# Patient Record
Sex: MALE | Race: WHITE | NOT HISPANIC OR LATINO | Employment: FULL TIME | URBAN - METROPOLITAN AREA
[De-identification: names, ages, dates, MRNs, and addresses within clinical notes are randomized per-mention and may not be internally consistent; named-entity substitution may affect disease eponyms.]

---

## 2017-01-13 ENCOUNTER — GENERIC CONVERSION - ENCOUNTER (OUTPATIENT)
Dept: OTHER | Facility: OTHER | Age: 32
End: 2017-01-13

## 2017-11-08 ENCOUNTER — ALLSCRIPTS OFFICE VISIT (OUTPATIENT)
Dept: OTHER | Facility: OTHER | Age: 32
End: 2017-11-08

## 2017-11-08 DIAGNOSIS — M25.50 PAIN IN JOINT: ICD-10-CM

## 2017-11-08 DIAGNOSIS — M79.641 PAIN OF RIGHT HAND: ICD-10-CM

## 2017-11-08 DIAGNOSIS — R51.9 HEADACHE: ICD-10-CM

## 2017-11-08 DIAGNOSIS — M25.562 PAIN IN LEFT KNEE: ICD-10-CM

## 2017-11-08 DIAGNOSIS — M25.561 PAIN IN RIGHT KNEE: ICD-10-CM

## 2017-11-08 DIAGNOSIS — M79.642 PAIN OF LEFT HAND: ICD-10-CM

## 2017-11-10 NOTE — PROGRESS NOTES
Assessment    1  Headache (784 0) (R51)   2  Diffuse arthralgia (719 40) (M25 50)    Plan  Diffuse arthralgia    · (1) ALEXANDRA SCREEN W/REFLEX TO TITER/PATTERN; Status:Active; Requestedfor:08Nov2017;    · (1) CBC/PLT/DIFF; Status:Active; Requested for:08Nov2017;    · (1) C-REACTIVE PROTEIN; Status:Active; Requested for:08Nov2017;    · (1) DNA (DS) ANTIBODY; Status:Active; Requested for:08Nov2017;    · (1) LYME ANTIBODY PROFILE W/REFLEX TO WESTERN BLOT; Status:Active; Requested for:08Nov2017;    · (1) SED RATE; Status:Active; Requested PLT:[id number]; Headache    · PredniSONE 10 MG Oral Tablet; TAKE 4 TABLETS DAILY FOR 3 DAYS,3 TABLETSDAILY FOR 3 DAYS, 2 TABLETS DAILY FOR 3 DAYS AND 1 TABLET DAILY FOR 3DAYS, THEN STOP    Discussion/Summary    Follow up in 3 weeks  The patient was counseled regarding diagnostic results,-- instructions for management,-- risk factor reductions,-- prognosis,-- patient and family education,-- impressions,-- risks and benefits of treatment options,-- importance of compliance with treatment  Chief Complaint  89has been getting frequent headachesall over body Joint painsbeen taking Aleve for the pain      History of Present Illness  HPI: pt complains of headaches, joint pain in knees, shoulders, elbows, wrists, ankles, the headache is frontal and on the top of his head, it all began 2 months ago, pt has tried Aneumed which does help a little      Review of Systems   Constitutional: no fever-- and-- no chills  Musculoskeletal: arthralgias, but-- no myalgias  Active Problems  1  Acute right-sided thoracic back pain (724 1) (M54 6)   2  Chronic pain of both knees (823 02,522 33) (M25 561,M25 562,G89 29)   3  Influenza vaccine needed (V04 81) (Z23)   4  Low back pain (724 2) (M54 5)    Past Medical History  1  History of asthma (V12 69) (Z87 09)    Family History  Mother    1  Family history of seizure disorder (V17 2) (Z82 0)  Father    2   Family history of Healthy male adult    Social History   · Alcohol drinker (V49 89) (Z78 9)   · SOCIAL BASIS ONLY   ·    · Never a smoker   · No drug use   · Occupation   · [employer]   · One child  The social history was reviewed and is unchanged  Surgical History  1  Denied: History Of Prior Surgery  Surgical History Reviewed: The surgical history was reviewed and updated today  Current Meds   1  No Reported Medications Recorded    The medication list was reviewed and updated today  Allergies  1  No Known Drug Allergies    Vitals   Recorded: [id number] 05:28PM   Temperature 96 5 F, Tympanic   Heart Rate 69   Systolic 609   Diastolic 90   Height 5 ft 11 25 in   Weight 349 lb    BMI Calculated 48 33   BSA Calculated 2 68   O2 Saturation 98       Physical Exam   Constitutional  General appearance: No acute distress, well appearing and well nourished  well developed,-- appears healthy,-- well nourished,-- obese-- and-- well hydrated  Ears, Nose, Mouth, and Throat  External inspection of ears and nose: Normal    Otoscopic examination: Tympanic membrance translucent with normal light reflex  Canals patent without erythema  Nasal mucosa, septum, and turbinates: Normal without edema or erythema  Oropharynx: Normal with no erythema, edema, exudate or lesions  Pulmonary  Respiratory effort: No increased work of breathing or signs of respiratory distress  Respiratory rate: normal  Assessment of respiratory effort revealed normal rhythm and effort  Auscultation of lungs: Clear to auscultation, equal breath sounds bilaterally, no wheezes, no rales, no rhonci  no rales or crackles were heard bilaterally  no rhonchi  no friction rub  no wheezing  Cardiovascular  Auscultation of heart: Normal rate and rhythm, normal S1 and S2, without murmurs  The heart rate was normal  The rhythm was regular  Heart sounds: normal S1-- and-- normal S2  no murmurs were heard          Signatures   Electronically signed by : Samantha Albarran DO; Nov 8 2017 5:51PM EST                       (Author)

## 2017-11-13 ENCOUNTER — APPOINTMENT (OUTPATIENT)
Dept: LAB | Facility: CLINIC | Age: 32
End: 2017-11-13
Payer: COMMERCIAL

## 2017-11-13 ENCOUNTER — TRANSCRIBE ORDERS (OUTPATIENT)
Dept: LAB | Facility: CLINIC | Age: 32
End: 2017-11-13

## 2017-11-13 DIAGNOSIS — M25.561 PAIN IN RIGHT KNEE: ICD-10-CM

## 2017-11-13 DIAGNOSIS — M25.562 PAIN IN LEFT KNEE: ICD-10-CM

## 2017-11-13 DIAGNOSIS — M25.50 PAIN IN JOINT: ICD-10-CM

## 2017-11-13 DIAGNOSIS — R51.9 HEADACHE: ICD-10-CM

## 2017-11-13 LAB
BASOPHILS # BLD AUTO: 0.03 THOUSANDS/ΜL (ref 0–0.1)
BASOPHILS NFR BLD AUTO: 1 % (ref 0–1)
CRP SERPL QL: 5.6 MG/L
EOSINOPHIL # BLD AUTO: 0.18 THOUSAND/ΜL (ref 0–0.61)
EOSINOPHIL NFR BLD AUTO: 3 % (ref 0–6)
ERYTHROCYTE [DISTWIDTH] IN BLOOD BY AUTOMATED COUNT: 12.8 % (ref 11.6–15.1)
ERYTHROCYTE [SEDIMENTATION RATE] IN BLOOD: 11 MM/HOUR (ref 0–10)
HCT VFR BLD AUTO: 41.1 % (ref 36.5–49.3)
HGB BLD-MCNC: 14.2 G/DL (ref 12–17)
LYMPHOCYTES # BLD AUTO: 1.79 THOUSANDS/ΜL (ref 0.6–4.47)
LYMPHOCYTES NFR BLD AUTO: 30 % (ref 14–44)
MCH RBC QN AUTO: 29 PG (ref 26.8–34.3)
MCHC RBC AUTO-ENTMCNC: 34.5 G/DL (ref 31.4–37.4)
MCV RBC AUTO: 84 FL (ref 82–98)
MONOCYTES # BLD AUTO: 0.45 THOUSAND/ΜL (ref 0.17–1.22)
MONOCYTES NFR BLD AUTO: 8 % (ref 4–12)
NEUTROPHILS # BLD AUTO: 3.5 THOUSANDS/ΜL (ref 1.85–7.62)
NEUTS SEG NFR BLD AUTO: 58 % (ref 43–75)
NRBC BLD AUTO-RTO: 0 /100 WBCS
PLATELET # BLD AUTO: 196 THOUSANDS/UL (ref 149–390)
PMV BLD AUTO: 10.7 FL (ref 8.9–12.7)
RBC # BLD AUTO: 4.89 MILLION/UL (ref 3.88–5.62)
WBC # BLD AUTO: 5.96 THOUSAND/UL (ref 4.31–10.16)

## 2017-11-13 PROCEDURE — 36415 COLL VENOUS BLD VENIPUNCTURE: CPT

## 2017-11-13 PROCEDURE — 86140 C-REACTIVE PROTEIN: CPT

## 2017-11-13 PROCEDURE — 86430 RHEUMATOID FACTOR TEST QUAL: CPT

## 2017-11-13 PROCEDURE — 86225 DNA ANTIBODY NATIVE: CPT

## 2017-11-13 PROCEDURE — 85025 COMPLETE CBC W/AUTO DIFF WBC: CPT

## 2017-11-13 PROCEDURE — 86618 LYME DISEASE ANTIBODY: CPT

## 2017-11-13 PROCEDURE — 85652 RBC SED RATE AUTOMATED: CPT

## 2017-11-13 PROCEDURE — 86038 ANTINUCLEAR ANTIBODIES: CPT

## 2017-11-14 LAB
B BURGDOR IGG SER IA-ACNC: 0.13
B BURGDOR IGM SER IA-ACNC: 0.19
DSDNA AB SER-ACNC: <1 IU/ML (ref 0–9)
RHEUMATOID FACT SER QL LA: NEGATIVE

## 2017-11-15 LAB — RYE IGE QN: NEGATIVE

## 2017-12-06 ENCOUNTER — GENERIC CONVERSION - ENCOUNTER (OUTPATIENT)
Dept: OTHER | Facility: OTHER | Age: 32
End: 2017-12-06

## 2018-01-12 VITALS
HEIGHT: 71 IN | HEART RATE: 69 BPM | BODY MASS INDEX: 44.1 KG/M2 | SYSTOLIC BLOOD PRESSURE: 150 MMHG | DIASTOLIC BLOOD PRESSURE: 90 MMHG | OXYGEN SATURATION: 98 % | TEMPERATURE: 96.5 F | WEIGHT: 315 LBS

## 2018-01-24 VITALS
WEIGHT: 315 LBS | HEART RATE: 63 BPM | SYSTOLIC BLOOD PRESSURE: 150 MMHG | DIASTOLIC BLOOD PRESSURE: 88 MMHG | TEMPERATURE: 96.6 F | BODY MASS INDEX: 44.1 KG/M2 | OXYGEN SATURATION: 98 % | HEIGHT: 71 IN

## 2018-04-02 LAB
ANION GAP SERPL CALCULATED.3IONS-SCNC: 12.6 MM/L
APTT PPP: 31.3 SEC (ref 24.4–37.6)
BASOPHILS # BLD AUTO: 0 X3/UL (ref 0–0.3)
BASOPHILS # BLD AUTO: 0.6 % (ref 0–2)
BUN SERPL-MCNC: 14 MG/DL (ref 7–25)
CALCIUM SERPL-MCNC: 9.3 MG/DL (ref 8.6–10.5)
CHLORIDE SERPL-SCNC: 105 MM/L (ref 98–107)
CO2 SERPL-SCNC: 25 MM/L (ref 21–31)
CREAT SERPL-MCNC: 0.81 MG/DL (ref 0.7–1.3)
DEPRECATED RDW RBC AUTO: 14.1 %
EGFR (HISTORICAL): > 60 GFR
EGFR AFRICAN AMERICAN (HISTORICAL): > 60 GFR
EOSINOPHIL # BLD AUTO: 0.1 X3/UL (ref 0–0.5)
EOSINOPHIL NFR BLD AUTO: 2.5 % (ref 0–5)
GLUCOSE (HISTORICAL): 102 MG/DL (ref 65–99)
HCT VFR BLD AUTO: 38.9 % (ref 42–52)
HGB BLD-MCNC: 13.5 G/DL (ref 14–18)
INR PPP: 1.02 (ref 0.9–1.5)
LDL CHOLESTEROL DIRECT (HISTORICAL): 58 MG/DL
LDL CHOLESTEROL DIRECT (HISTORICAL): 58 MG/DL
LYMPHOCYTES # BLD AUTO: 1.4 X3/UL (ref 1.2–4.2)
LYMPHOCYTES NFR BLD AUTO: 27.2 % (ref 20.5–51.1)
MCH RBC QN AUTO: 29.5 PG (ref 26–34)
MCHC RBC AUTO-ENTMCNC: 34.7 G/DL (ref 31–37)
MCV RBC AUTO: 85.1 FL (ref 81–99)
MONOCYTES # BLD AUTO: 0.4 X3/UL (ref 0–1)
MONOCYTES NFR BLD AUTO: 7.6 % (ref 1.7–12)
NEUTROPHILS # BLD AUTO: 3.1 X3/UL (ref 1.4–6.5)
NEUTS SEG NFR BLD AUTO: 62.1 % (ref 42.2–75.2)
OSMOLALITY, SERUM (HISTORICAL): 278 MOSM (ref 262–291)
PLATELET # BLD AUTO: 145 X3/UL (ref 130–400)
PMV BLD AUTO: 9.1 FL
POTASSIUM SERPL-SCNC: 3.6 MM/L (ref 3.5–5.5)
PROTHROMBIN TIME (HISTORICAL): 11.7 SEC (ref 10.1–12.9)
RBC # BLD AUTO: 4.58 X6/UL (ref 4.3–5.9)
SODIUM SERPL-SCNC: 139 MM/L (ref 134–143)
WBC # BLD AUTO: 5.1 X3/UL (ref 4.8–10.8)

## 2018-10-16 ENCOUNTER — HOSPITAL ENCOUNTER (EMERGENCY)
Facility: HOSPITAL | Age: 33
Discharge: HOME | End: 2018-10-16
Attending: EMERGENCY MEDICINE | Admitting: EMERGENCY MEDICINE
Payer: COMMERCIAL

## 2018-10-16 VITALS
DIASTOLIC BLOOD PRESSURE: 96 MMHG | BODY MASS INDEX: 41.17 KG/M2 | OXYGEN SATURATION: 100 % | SYSTOLIC BLOOD PRESSURE: 162 MMHG | WEIGHT: 304 LBS | RESPIRATION RATE: 18 BRPM | HEIGHT: 72 IN | TEMPERATURE: 99.1 F | HEART RATE: 114 BPM

## 2018-10-16 DIAGNOSIS — L02.91 ABSCESS: ICD-10-CM

## 2018-10-16 DIAGNOSIS — L03.90 CELLULITIS: Primary | ICD-10-CM

## 2018-10-16 PROCEDURE — 87186 SC STD MICRODIL/AGAR DIL: CPT | Performed by: EMERGENCY MEDICINE

## 2018-10-16 PROCEDURE — 87147 CULTURE TYPE IMMUNOLOGIC: CPT | Performed by: EMERGENCY MEDICINE

## 2018-10-16 PROCEDURE — 87070 CULTURE OTHR SPECIMN AEROBIC: CPT | Performed by: EMERGENCY MEDICINE

## 2018-10-16 PROCEDURE — 87205 SMEAR GRAM STAIN: CPT | Performed by: EMERGENCY MEDICINE

## 2018-10-16 PROCEDURE — 99283 EMERGENCY DEPT VISIT LOW MDM: CPT

## 2018-10-16 RX ORDER — SULFAMETHOXAZOLE AND TRIMETHOPRIM 800; 160 MG/1; MG/1
2 TABLET ORAL ONCE
Status: COMPLETED | OUTPATIENT
Start: 2018-10-16 | End: 2018-10-16

## 2018-10-16 RX ORDER — SULFAMETHOXAZOLE AND TRIMETHOPRIM 800; 160 MG/1; MG/1
1 TABLET ORAL 2 TIMES DAILY
Qty: 14 TABLET | Refills: 0 | Status: SHIPPED | OUTPATIENT
Start: 2018-10-16 | End: 2018-10-20 | Stop reason: HOSPADM

## 2018-10-16 RX ADMIN — SULFAMETHOXAZOLE AND TRIMETHOPRIM 2 TABLET: 800; 160 TABLET ORAL at 20:03

## 2018-10-16 NOTE — DISCHARGE INSTRUCTIONS
Abscess     I have juan m the area of redness around the wound  If you develop:        Fever        Increasing redness        Feeling ill  Then you must return to the ER  EP developed an abscess the comes to a head please return to the emergency department to have it opened      [address]:   A warm compress may help your abscess drain  Your healthcare provider may make a cut in the abscess so it can drain  You may need surgery to remove an abscess that is on your hands or buttocks  DISCHARGE INSTRUCTIONS:   Return to the emergency department if:   · The area around your abscess becomes very painful, warm, or has red streaks  · You have a fever and chills  · Your heart is beating faster than usual      · You feel faint or confused  Contact your healthcare provider if:   · Your abscess gets bigger or does not get better  · Your abscess returns  · You have questions or concerns about your condition or care  Medicines: You may  need any of the following:  · Antibiotics  help treat a bacterial infection  · Acetaminophen  decreases pain and fever  It is available without a doctor's order  Ask how much to take and how often to take it  Follow directions  Acetaminophen can cause liver damage if not taken correctly  · NSAIDs , such as ibuprofen, help decrease swelling, pain, and fever  This medicine is available with or without a doctor's order  NSAIDs can cause stomach bleeding or kidney problems in certain people  If you take blood thinner medicine, always ask your healthcare provider if NSAIDs are safe for you  Always read the medicine label and follow directions  · Take your medicine as directed  Contact your healthcare provider if you think your medicine is not helping or if you have side effects  Tell him or her if you are allergic to any medicine  Keep a list of the medicines, vitamins, and herbs you take  Include the amounts, and when and why you take them   Bring the list or the pill bottles to follow-up visits  Carry your medicine list with you in case of an emergency  Self-care:   · Apply a warm compress to your abscess  This will help it open and drain  Wet a washcloth in warm, but not hot, water  Apply the compress for 10 minutes  Repeat this 4 times each day  Do not  press on an abscess or try to open it with a needle  You may push the bacteria deeper or into your blood  · Do not share your clothes, towels, or sheets with anyone  This can spread the infection to others  · Wash your hands often  This can help prevent the spread of germs  Use soap and water or an alcohol-based hand rub  Care for your wound after it is drained:   · Care for your wound as directed  If your healthcare provider says it is okay, carefully remove the bandage and gauze packing  You may need to soak the gauze to get it out of your wound  Clean your wound and the area around it as directed  Dry the area and put on new, clean bandages  Change your bandages when they get wet or dirty  · Ask your healthcare provider how to change the gauze in your wound  Keep track of how many pieces of gauze are placed inside the wound  Do not put too much packing in the wound  Do not pack the gauze too tightly in your wound  Follow up with your healthcare provider in 1 to 3 days: You may need to have your packing removed or your bandage changed  Write down your questions so you remember to ask them during your visits  © 2017 [address] Information is for End User's use only and may not be sold, redistributed or otherwise used for commercial purposes  All illustrations and images included in CareNotes® are the copyrighted property of [employer]  or [employer]  The above information is an  only  It is not intended as medical advice for individual conditions or treatments   Talk to your doctor, nurse or pharmacist before following any medical regimen to see if it is safe and effective for you  Cellulitis, Ambulatory Care   GENERAL INFORMATION:   Cellulitis  is a skin infection caused by bacteria  Common symptoms include the following:   · Fever    · A red, warm, swollen area on your skin    · Pain when the area is touched    · Bumps or blisters (abscess) that may drain pus    · Bumpy, raised skin that feels like an orange peel  Seek immediate care for the following symptoms:   · An increase in pain, redness, warmth, and size    · Red streaks coming from the infected area    · A thin, gray-brown discharge coming from your infected skin area    · A crackling under your skin when you touch it    · Purple dots or bumps on your skin, or bleeding under your skin    · New swelling and pain in your legs    · Sudden trouble breathing or chest pain  Treatment for cellulitis  may include medicines to treat the bacterial infection or decrease pain  The infection may need to be cleaned out  Damaged, dead, or infected tissue may need to be cut away to help your wound heal   Manage your symptoms:   · Elevate your wound above the level of your heart  as often as you can  This will help decrease swelling and pain  Prop your wound on pillows or blankets to keep it elevated comfortably  · Clean your wound as directed  You may need to wash the wound with soap and water  Look for signs of infection  · Wear pressure stockings as directed  The stockings are tight and put pressure on your legs  This improves blood flow and decreases swelling  Prevent cellulitis:   · Wash your hands often  Use soap and water  Wash your hands after you use the bathroom, change a child's diapers, or sneeze  Wash your hands before you prepare or eat food  Use lotion to prevent dry, cracked skin  · Do not share personal items, such as towels, clothing, and razors  · Clean exercise equipment  with germ-killing  before and after you use it    Follow up with your healthcare provider as directed:  Write down your questions so you remember to ask them during your visits  CARE AGREEMENT:   You have the right to help plan your care  Learn about your health condition and how it may be treated  Discuss treatment options with your caregivers to decide what care you want to receive  You always have the right to refuse treatment  The above information is an  only  It is not intended as medical advice for individual conditions or treatments  Talk to your doctor, nurse or pharmacist before following any medical regimen to see if it is safe and effective for you  © 2014 [address] is for End User's use only and may not be sold, redistributed or otherwise used for commercial purposes  All illustrations and images included in CareNotes® are the copyrighted property of [employer]  or [employer]

## 2018-10-16 NOTE — ED PROVIDER NOTES
History  Chief Complaint   Patient presents with    Abscess     R proximal thigh     Patient is a 50-year-old man who presents complaining of several days of pain and swelling in his right groin  He says he believes he has an abscess  He has no fever chills  He says is painful to walk            None       Past Medical History:   Diagnosis Date    Seasonal allergies        History reviewed  No pertinent surgical history  History reviewed  No pertinent family history  I have reviewed and agree with the history as documented  Social History   Substance Use Topics    Smoking status: Never Smoker    Smokeless tobacco: Never Used    Alcohol use No        Review of Systems   Constitutional: Negative for chills, fatigue, fever and unexpected weight change  HENT: Negative for congestion and nosebleeds  Eyes: Negative for visual disturbance  Respiratory: Negative for chest tightness and shortness of breath  Cardiovascular: Negative for chest pain, palpitations and leg swelling  Gastrointestinal: Negative for abdominal pain, blood in stool, diarrhea, nausea and vomiting  Endocrine: Negative for cold intolerance and heat intolerance  Genitourinary: Negative for difficulty urinating  Musculoskeletal: Negative for arthralgias, back pain, gait problem, joint swelling and myalgias  Skin: Negative for rash  Neurological: Negative for dizziness, speech difficulty, weakness and headaches  Psychiatric/Behavioral: Negative for behavioral problems, confusion, self-injury and suicidal ideas  All other systems reviewed and are negative  Physical Exam  Physical Exam   Constitutional: He is oriented to person, place, and time  He appears well-developed and well-nourished  HENT:   Head: Normocephalic and atraumatic  Nose: Nose normal    Eyes: Pupils are equal, round, and reactive to light  EOM are normal    Neck: Normal range of motion  Neck supple     Cardiovascular: Normal rate, regular rhythm and normal heart sounds  Exam reveals no gallop and no friction rub  No murmur heard  Pulmonary/Chest: Effort normal and breath sounds normal  No respiratory distress  He has no wheezes  He has no rales  Abdominal: Soft  He exhibits no distension  There is no tenderness  There is no rebound and no guarding  Musculoskeletal: Normal range of motion  He exhibits no edema  Neurological: He is alert and oriented to person, place, and time  Skin: Skin is warm and dry  Patient has a 2 cm area of induration in his right groin  Surrounding that patient has a cm of erythema consistent with cellulitis  There is no fluctuance   Psychiatric: He has a normal mood and affect  His behavior is normal  Judgment and thought content normal    Nursing note and vitals reviewed        Vital Signs  ED Triage Vitals [10/16/18 1937]   Temperature Pulse Respirations Blood Pressure SpO2   99 1 °F (37 3 °C) (!) 114 18 162/96 100 %      Temp Source Heart Rate Source Patient Position - Orthostatic VS BP Location FiO2 (%)   Tympanic Monitor Sitting Left arm --      Pain Score       7           Vitals:    10/16/18 1937   BP: 162/96   Pulse: (!) 114   Patient Position - Orthostatic VS: Sitting       Visual Acuity      ED Medications  Medications   sulfamethoxazole-trimethoprim (BACTRIM DS) 800-160 mg per tablet 2 tablet (not administered)       Diagnostic Studies  Results Reviewed     Procedure Component Value Units Date/Time    Wound culture and Gram stain [[id number]]     Lab Status:  No result Specimen:  Wound                  No orders to display              Procedures  Incision/Drainage  Date/Time: 10/16/2018 7:43 PM  Performed by: Amador Villalobos by: MARLENE Ladd     Patient location:  ED  Consent:     Consent obtained:  Verbal    Consent given by:  Patient  Universal protocol:     Procedure explained and questions answered to patient or proxy's satisfaction: yes      Patient identity confirmed:  Verbally with patient  Location:     Type:  Abscess  Pre-procedure details:     Skin preparation:  Betadine  Anesthesia (see MAR for exact dosages): Anesthesia method:  None  Procedure details:     Complexity:  Simple    Needle aspiration: yes      Needle size:  18 G    Drainage:  Purulent    Drainage amount:  Scant  Comments:      Patient had an area of induration  With needle aspiration was able to get less than 1 cc out  This was sent for culture  Phone Contacts  ED Phone Contact    ED Course  ED Course as of Oct 16 1959   Tue Oct 16, 2018   1953 I have circumscribed area of cellulitis  After needle aspiration I will begin patient on p o  Antibiotics  As cellulitis resolves and the small abscess comes to a head I patient has been instructed to return for surgical I and D  MDM  CritCare Time    Disposition  Final diagnoses:   Cellulitis   Abscess     Time reflects when diagnosis was documented in both MDM as applicable and the Disposition within this note     Time User Action Codes Description Comment    10/16/2018  7:54 PM Mallissa Aranda Add [L03 90] Cellulitis     10/16/2018  7:55 PM Mallissa Aranda Add [L02 91] Abscess       ED Disposition     ED Disposition Condition Comment    Discharge  Isis Coffey discharge to home/self care      Condition at discharge: Good        Follow-up Information     Follow up With Specialties Details Why Contact Info    Teresa Bella DO Family Medicine   [facility] [address] [facility]  [phone]      Claudette Brock, MD General Surgery, Radiology Call  [address]  [address] [facility]  [phone]            Patient's Medications   Discharge Prescriptions    SULFAMETHOXAZOLE-TRIMETHOPRIM (BACTRIM DS) 800-160 MG PER TABLET    Take 1 tablet by mouth 2 (two) times a day for 7 days smx-tmp DS (BACTRIM) 800-160 mg tabs (1tab q12 D10)       Start Date: 10/16/2018End Date: 10/23/2018       Order Dose: 1 tablet Quantity: 14 tablet    Refills: 0     No discharge procedures on file      ED Provider  Electronically Signed by           Ziyad Dailey MD  10/16/18 9737       Ziyad Dailey MD  10/16/18 [id number]       Ziyad Dailey MD  10/16/18 2003

## 2018-10-17 ENCOUNTER — OFFICE VISIT (OUTPATIENT)
Dept: FAMILY MEDICINE CLINIC | Facility: CLINIC | Age: 33
End: 2018-10-17
Payer: COMMERCIAL

## 2018-10-17 ENCOUNTER — HOSPITAL ENCOUNTER (INPATIENT)
Facility: HOSPITAL | Age: 33
LOS: 2 days | Discharge: HOME | DRG: 603 | End: 2018-10-20
Attending: EMERGENCY MEDICINE | Admitting: INTERNAL MEDICINE
Payer: COMMERCIAL

## 2018-10-17 VITALS
TEMPERATURE: 102 F | HEIGHT: 72 IN | DIASTOLIC BLOOD PRESSURE: 90 MMHG | BODY MASS INDEX: 41.99 KG/M2 | SYSTOLIC BLOOD PRESSURE: 162 MMHG | WEIGHT: 310 LBS

## 2018-10-17 DIAGNOSIS — L02.234 CARBUNCLE OF GROIN: Primary | ICD-10-CM

## 2018-10-17 DIAGNOSIS — R50.9 FEVER: ICD-10-CM

## 2018-10-17 DIAGNOSIS — L02.214 ABSCESS OF RIGHT GROIN: Primary | ICD-10-CM

## 2018-10-17 DIAGNOSIS — E66.01 CLASS 3 SEVERE OBESITY DUE TO EXCESS CALORIES WITHOUT SERIOUS COMORBIDITY WITH BODY MASS INDEX (BMI) OF 40.0 TO 44.9 IN ADULT (HCC): ICD-10-CM

## 2018-10-17 DIAGNOSIS — L03.115 CELLULITIS OF RIGHT THIGH: ICD-10-CM

## 2018-10-17 DIAGNOSIS — L03.818 CELLULITIS OF OTHER SPECIFIED SITE: ICD-10-CM

## 2018-10-17 PROBLEM — B99.9 FEVER DUE TO INFECTION: Status: ACTIVE | Noted: 2018-10-17

## 2018-10-17 LAB
ANION GAP SERPL CALCULATED.3IONS-SCNC: 7 MMOL/L (ref 4–13)
BASOPHILS # BLD AUTO: 0.03 THOUSANDS/ΜL (ref 0–0.1)
BASOPHILS NFR BLD AUTO: 0 % (ref 0–1)
BILIRUB UR QL STRIP: NEGATIVE
BUN SERPL-MCNC: 13 MG/DL (ref 5–25)
CALCIUM SERPL-MCNC: 9.2 MG/DL (ref 8.3–10.1)
CHLORIDE SERPL-SCNC: 98 MMOL/L (ref 100–108)
CLARITY UR: CLEAR
CO2 SERPL-SCNC: 31 MMOL/L (ref 21–32)
COLOR UR: YELLOW
CREAT SERPL-MCNC: 0.99 MG/DL (ref 0.6–1.3)
EOSINOPHIL # BLD AUTO: 0.05 THOUSAND/ΜL (ref 0–0.61)
EOSINOPHIL NFR BLD AUTO: 1 % (ref 0–6)
ERYTHROCYTE [DISTWIDTH] IN BLOOD BY AUTOMATED COUNT: 12.3 % (ref 11.6–15.1)
GFR SERPL CREATININE-BSD FRML MDRD: 99 ML/MIN/1.73SQ M
GLUCOSE SERPL-MCNC: 96 MG/DL (ref 65–140)
GLUCOSE UR STRIP-MCNC: NEGATIVE MG/DL
HCT VFR BLD AUTO: 40.1 % (ref 36.5–49.3)
HGB BLD-MCNC: 13.5 G/DL (ref 12–17)
HGB UR QL STRIP.AUTO: NEGATIVE
IMM GRANULOCYTES # BLD AUTO: 0.06 THOUSAND/UL (ref 0–0.2)
IMM GRANULOCYTES NFR BLD AUTO: 1 % (ref 0–2)
KETONES UR STRIP-MCNC: NEGATIVE MG/DL
LACTATE SERPL-SCNC: 1.2 MMOL/L (ref 0.5–2)
LEUKOCYTE ESTERASE UR QL STRIP: NEGATIVE
LYMPHOCYTES # BLD AUTO: 1.62 THOUSANDS/ΜL (ref 0.6–4.47)
LYMPHOCYTES NFR BLD AUTO: 15 % (ref 14–44)
MCH RBC QN AUTO: 29 PG (ref 26.8–34.3)
MCHC RBC AUTO-ENTMCNC: 33.7 G/DL (ref 31.4–37.4)
MCV RBC AUTO: 86 FL (ref 82–98)
MONOCYTES # BLD AUTO: 1.13 THOUSAND/ΜL (ref 0.17–1.22)
MONOCYTES NFR BLD AUTO: 11 % (ref 4–12)
NEUTROPHILS # BLD AUTO: 7.74 THOUSANDS/ΜL (ref 1.85–7.62)
NEUTS SEG NFR BLD AUTO: 72 % (ref 43–75)
NITRITE UR QL STRIP: NEGATIVE
NRBC BLD AUTO-RTO: 0 /100 WBCS
PH UR STRIP.AUTO: 6 [PH] (ref 4.5–8)
PLATELET # BLD AUTO: 166 THOUSANDS/UL (ref 149–390)
PMV BLD AUTO: 9.5 FL (ref 8.9–12.7)
POTASSIUM SERPL-SCNC: 4.8 MMOL/L (ref 3.5–5.3)
PROT UR STRIP-MCNC: NEGATIVE MG/DL
RBC # BLD AUTO: 4.66 MILLION/UL (ref 3.88–5.62)
SODIUM SERPL-SCNC: 136 MMOL/L (ref 136–145)
SP GR UR STRIP.AUTO: 1.01 (ref 1–1.03)
UROBILINOGEN UR QL STRIP.AUTO: 0.2 E.U./DL
WBC # BLD AUTO: 10.63 THOUSAND/UL (ref 4.31–10.16)

## 2018-10-17 PROCEDURE — 1111F DSCHRG MED/CURRENT MED MERGE: CPT | Performed by: FAMILY MEDICINE

## 2018-10-17 PROCEDURE — 83605 ASSAY OF LACTIC ACID: CPT | Performed by: PHYSICIAN ASSISTANT

## 2018-10-17 PROCEDURE — 99214 OFFICE O/P EST MOD 30 MIN: CPT | Performed by: FAMILY MEDICINE

## 2018-10-17 PROCEDURE — 81003 URINALYSIS AUTO W/O SCOPE: CPT | Performed by: PHYSICIAN ASSISTANT

## 2018-10-17 PROCEDURE — 96375 TX/PRO/DX INJ NEW DRUG ADDON: CPT

## 2018-10-17 PROCEDURE — 36415 COLL VENOUS BLD VENIPUNCTURE: CPT | Performed by: PHYSICIAN ASSISTANT

## 2018-10-17 PROCEDURE — 0H9AXZZ DRAINAGE OF INGUINAL SKIN, EXTERNAL APPROACH: ICD-10-PCS | Performed by: PHYSICIAN ASSISTANT

## 2018-10-17 PROCEDURE — 99218 PR INITIAL OBSERVATION CARE/DAY 30 MINUTES: CPT | Performed by: PHYSICIAN ASSISTANT

## 2018-10-17 PROCEDURE — 87070 CULTURE OTHR SPECIMN AEROBIC: CPT | Performed by: PHYSICIAN ASSISTANT

## 2018-10-17 PROCEDURE — 85025 COMPLETE CBC W/AUTO DIFF WBC: CPT | Performed by: PHYSICIAN ASSISTANT

## 2018-10-17 PROCEDURE — 87147 CULTURE TYPE IMMUNOLOGIC: CPT | Performed by: PHYSICIAN ASSISTANT

## 2018-10-17 PROCEDURE — 87205 SMEAR GRAM STAIN: CPT | Performed by: PHYSICIAN ASSISTANT

## 2018-10-17 PROCEDURE — 96365 THER/PROPH/DIAG IV INF INIT: CPT

## 2018-10-17 PROCEDURE — 87186 SC STD MICRODIL/AGAR DIL: CPT | Performed by: PHYSICIAN ASSISTANT

## 2018-10-17 PROCEDURE — 87040 BLOOD CULTURE FOR BACTERIA: CPT | Performed by: PHYSICIAN ASSISTANT

## 2018-10-17 PROCEDURE — 80048 BASIC METABOLIC PNL TOTAL CA: CPT | Performed by: PHYSICIAN ASSISTANT

## 2018-10-17 PROCEDURE — 99285 EMERGENCY DEPT VISIT HI MDM: CPT

## 2018-10-17 RX ORDER — ACETAMINOPHEN 325 MG/1
650 TABLET ORAL EVERY 6 HOURS PRN
Status: DISCONTINUED | OUTPATIENT
Start: 2018-10-17 | End: 2018-10-20 | Stop reason: HOSPADM

## 2018-10-17 RX ORDER — LIDOCAINE HYDROCHLORIDE AND EPINEPHRINE 10; 10 MG/ML; UG/ML
10 INJECTION, SOLUTION INFILTRATION; PERINEURAL ONCE
Status: COMPLETED | OUTPATIENT
Start: 2018-10-17 | End: 2018-10-17

## 2018-10-17 RX ORDER — OXYCODONE HYDROCHLORIDE AND ACETAMINOPHEN 5; 325 MG/1; MG/1
1 TABLET ORAL EVERY 4 HOURS PRN
Status: DISCONTINUED | OUTPATIENT
Start: 2018-10-17 | End: 2018-10-20 | Stop reason: HOSPADM

## 2018-10-17 RX ORDER — HEPARIN SODIUM 5000 [USP'U]/ML
7500 INJECTION, SOLUTION INTRAVENOUS; SUBCUTANEOUS EVERY 8 HOURS SCHEDULED
Status: DISCONTINUED | OUTPATIENT
Start: 2018-10-17 | End: 2018-10-17

## 2018-10-17 RX ORDER — ACETAMINOPHEN 325 MG/1
650 TABLET ORAL ONCE
Status: COMPLETED | OUTPATIENT
Start: 2018-10-17 | End: 2018-10-17

## 2018-10-17 RX ORDER — AMOXICILLIN AND CLAVULANATE POTASSIUM 875; 125 MG/1; MG/1
1 TABLET, FILM COATED ORAL EVERY 12 HOURS SCHEDULED
Qty: 20 TABLET | Refills: 0 | Status: SHIPPED | OUTPATIENT
Start: 2018-10-17 | End: 2018-10-20 | Stop reason: HOSPADM

## 2018-10-17 RX ADMIN — VANCOMYCIN HYDROCHLORIDE 2000 MG: 1 INJECTION, POWDER, LYOPHILIZED, FOR SOLUTION INTRAVENOUS at 18:15

## 2018-10-17 RX ADMIN — LIDOCAINE HYDROCHLORIDE AND EPINEPHRINE 10 ML: 10; 10 INJECTION, SOLUTION INFILTRATION; PERINEURAL at 17:35

## 2018-10-17 RX ADMIN — CEFEPIME HYDROCHLORIDE 2000 MG: 2 INJECTION, SOLUTION INTRAVENOUS at 17:37

## 2018-10-17 RX ADMIN — ACETAMINOPHEN 650 MG: 325 TABLET, FILM COATED ORAL at 17:35

## 2018-10-17 RX ADMIN — SODIUM CHLORIDE 2000 ML: 0.9 INJECTION, SOLUTION INTRAVENOUS at 17:36

## 2018-10-17 RX ADMIN — OXYCODONE HYDROCHLORIDE AND ACETAMINOPHEN 1 TABLET: 5; 325 TABLET ORAL at 20:30

## 2018-10-17 NOTE — ED PROVIDER NOTES
History  Chief Complaint   Patient presents with    Abscess     Saturday night patient noticed a sore bump in right upper groin  Sunday noticed it was getting bigger and continued to increase in size and become very painful  Was seen at [facility] ER last evening, had abscess cultured  Called PCP today due to fever and chills and was told to come to ER to seen general surgery  This is a 48-year-old male patient who noted that had a lump on his right groin and was sore 4 days ago  He went to emergency room yesterday was told that he has cellulitis without abscess a small amount of culture was taken which grew out a Staph  No sensitivities  He was put on Bactrim  He follow up with his family doctor today and was placed on Augmentin however did not fill the prescription at that time he had 102 fever and shaking chills  He presents today with 101 7 he has had shaking chills he feels fatigued he has pain in his right groin  The erythema has  Supeceeded  the line of demarcation drawn by the emergency department last night  No headache no blurred vision or double vision cough sore throat or rhinorrhea no chest pain or shortness of breath no nausea vomiting diarrhea abdominal pain no pain testicles urgency frequency or dysuria  The right groin only hurts when he moves holds still it feels better  Prior to Admission Medications   Prescriptions Last Dose Informant Patient Reported?  Taking?   amoxicillin-clavulanate (AUGMENTIN) 875-125 mg per tablet   No No   Sig: Take 1 tablet by mouth every 12 (twelve) hours for 10 days   sulfamethoxazole-trimethoprim (BACTRIM DS) 800-160 mg per tablet   No No   Sig: Take 1 tablet by mouth 2 (two) times a day for 7 days smx-tmp DS (BACTRIM) 800-160 mg tabs (1tab q12 D10)      Facility-Administered Medications: None       Past Medical History:   Diagnosis Date    COPD (chronic obstructive pulmonary disease) (HCC)     Murmur, heart     Seasonal allergies History reviewed  No pertinent surgical history  History reviewed  No pertinent family history  I have reviewed and agree with the history as documented  Social History   Substance Use Topics    Smoking status: Never Smoker    Smokeless tobacco: Never Used    Alcohol use No      Comment: socially        Review of Systems   All other systems reviewed and are negative  Physical Exam  Physical Exam   Constitutional: He appears well-developed and well-nourished  HENT:   Head: Normocephalic and atraumatic  Right Ear: External ear normal    Left Ear: External ear normal    Nose: Nose normal    Mouth/Throat: Oropharynx is clear and moist    Eyes: Pupils are equal, round, and reactive to light  Conjunctivae are normal    Neck: Normal range of motion  Neck supple  Cardiovascular: Normal rate and regular rhythm  Pulmonary/Chest: Effort normal and breath sounds normal    Abdominal: Soft  Bowel sounds are normal  There is no tenderness  Musculoskeletal:        Legs:  Neurological: He is alert  Skin: Skin is warm  Psychiatric: He has a normal mood and affect  His behavior is normal    Nursing note and vitals reviewed        Vital Signs  ED Triage Vitals [10/17/18 1615]   Temperature Pulse Respirations Blood Pressure SpO2   (!) 101 7 °F (38 7 °C) 94 18 147/71 100 %      Temp Source Heart Rate Source Patient Position - Orthostatic VS BP Location FiO2 (%)   Oral -- Sitting Right arm --      Pain Score       Worst Possible Pain           Vitals:    10/17/18 1615   BP: 147/71   Pulse: 94   Patient Position - Orthostatic VS: Sitting       Visual Acuity      ED Medications  Medications   sodium chloride 0 9 % bolus 2,000 mL (2,000 mL Intravenous New Bag 10/17/18 8277)   vancomycin (VANCOCIN) 2,000 mg in sodium chloride 0 9 % 500 mL IVPB (not administered)   lidocaine-epinephrine (XYLOCAINE/EPINEPHRINE) 1 %-1:100,000 injection 10 mL (10 mL Infiltration Given by Other 10/17/18 0565)   cefepime (MAXIPIME) IVPB (premix) 2,000 mg (0 mg Intravenous Stopped 10/17/18 1807)   acetaminophen (TYLENOL) tablet 650 mg (650 mg Oral Given 10/17/18 1735)       Diagnostic Studies  Results Reviewed     Procedure Component Value Units Date/Time    Lactic acid, plasma [[id number]]  (Normal) Collected:  10/17/18 1728    Lab Status:  Final result Specimen:  Blood from Arm, Right Updated:  10/17/18 1754     LACTIC ACID 1 2 mmol/L     Narrative:         Result may be elevated if tourniquet was used during collection  Basic metabolic panel [[id number]]  (Abnormal) Collected:  10/17/18 1728    Lab Status:  Final result Specimen:  Blood from Arm, Right Updated:  10/17/18 1746     Sodium 136 mmol/L      Potassium 4 8 mmol/L      Chloride 98 (L) mmol/L      CO2 31 mmol/L      ANION GAP 7 mmol/L      BUN 13 mg/dL      Creatinine 0 99 mg/dL      Glucose 96 mg/dL      Calcium 9 2 mg/dL      eGFR 99 ml/min/1 73sq m     Narrative:         National Kidney Disease Education Program recommendations are as follows:  GFR calculation is accurate only with a steady state creatinine  Chronic Kidney disease less than 60 ml/min/1 73 sq  meters  Kidney failure less than 15 ml/min/1 73 sq  meters      CBC and differential [[id number]]  (Abnormal) Collected:  10/17/18 1728    Lab Status:  Final result Specimen:  Blood from Arm, Right Updated:  10/17/18 1738     WBC 10 63 (H) Thousand/uL      RBC 4 66 Million/uL      Hemoglobin 13 5 g/dL      Hematocrit 40 1 %      MCV 86 fL      MCH 29 0 pg      MCHC 33 7 g/dL      RDW 12 3 %      MPV 9 5 fL      Platelets 220 Thousands/uL      nRBC 0 /100 WBCs      Neutrophils Relative 72 %      Immat GRANS % 1 %      Lymphocytes Relative 15 %      Monocytes Relative 11 %      Eosinophils Relative 1 %      Basophils Relative 0 %      Neutrophils Absolute 7 74 (H) Thousands/µL      Immature Grans Absolute 0 06 Thousand/uL      Lymphocytes Absolute 1 62 Thousands/µL      Monocytes Absolute 1 13 Thousand/µL Eosinophils Absolute 0 05 Thousand/µL      Basophils Absolute 0 03 Thousands/µL     Blood culture #1 [[id number]] Collected:  10/17/18 1728    Lab Status: In process Specimen:  Blood from Arm, Right Updated:  10/17/18 1735    Blood culture #2 [[id number]] Collected:  10/17/18 1728    Lab Status: In process Specimen:  Blood from Arm, Left Updated:  10/17/18 1735    Wound culture and Gram stain [[id number]]     Lab Status:  No result Specimen:  Wound from Groin                  No orders to display              Procedures  Incision/Drainage  Date/Time: 10/17/2018 6:07 PM  Performed by: Dionte Chan  Authorized by: Dionte Chan     Patient location:  ED  Consent:     Consent obtained:  Written    Consent given by:  Patient    Risks discussed:  Bleeding, incomplete drainage, pain and infection    Alternatives discussed:  Referral  [place] protocol:     Procedure explained and questions answered to patient or proxy's satisfaction: yes      Test results available and properly labeled: yes      Immediately prior to procedure a time out was called: yes      Patient identity confirmed:  Verbally with patient  Location:     Type:  Abscess (right groin)    Size:  8x8    Location: right groin  Pre-procedure details:     Skin preparation:  Betadine  Anesthesia (see MAR for exact dosages): Anesthesia method:  Local infiltration    Local anesthetic:  Lidocaine 1% WITH epi  Procedure details:     Complexity:  Simple    Needle aspiration: no      Incision types:  Stab incision    Scalpel blade:  11    Approach:  Open    Incision depth:  Skin    Wound management:  Probed and deloculated and debrided    Drainage:  Bloody and purulent    Drainage amount: Moderate    Wound treatment:  Drain placed    Packing materials:  1/2 in gauze  Post-procedure details:     Patient tolerance of procedure:   Tolerated well, no immediate complications           Phone Contacts  ED Phone Contact    ED Course MDM  Number of Diagnoses or Management Options  Abscess of right groin:   Cellulitis of right thigh:   Fever:   Diagnosis management comments: Spoke with Dr Cardoza explained the patient presented with fever cellulitis with abscess  He was on Bactrim without improvement  Originally concern for sepsis that does not appear to be the case she has partially treated however with the significant cellulitis patient will be admitted for IV antibiotics and surgical consultation  CritCare Time    Disposition  Final diagnoses:   Abscess of right groin   Fever   Cellulitis of right thigh     Time reflects when diagnosis was documented in both MDM as applicable and the Disposition within this note     Time User Action Codes Description Comment    10/17/2018  6:15 PM Juan Jose Mueller [[id number] [id number]] Abscess of right groin     10/17/2018  6:15 PM Juan Jose Mi Add [R50 [id number]] Fever     10/17/2018  6:15 PM [place], [address] [place] [address] [[id number]] Cellulitis of right thigh       ED Disposition     ED Disposition Condition Comment    Admit  Case was discussed with Dr Ronny Hammer and the patient's admission status was agreed to be Admission Status: observation status to the service of Dr Ronny Hammer   Follow-up Information    None         Patient's Medications   Discharge Prescriptions    No medications on file     No discharge procedures on file      ED Provider  Electronically Signed by           Adrienne Coleman PA-C  10/17/18 1822

## 2018-10-17 NOTE — ASSESSMENT & PLAN NOTE
- abscess developed this past Saturday and has progressively increased in size and induration  - he was examined at emergency [address] [place] [address] last evening where a culture was obtained and he was placed on Bactrim    - went to his PCP today, was directed to the emergency department  - I & D was performed in emergency department evaluating copious amount of purulent drainage  - patient admitted to medical-surgical observation status  - started on empiric IV antibiotics of cefepime and vancomycin  - surgical consult placed for tomorrow

## 2018-10-17 NOTE — PROGRESS NOTES
Assessment/Plan:    No problem-specific Assessment & Plan notes found for this encounter  Diagnoses and all orders for this visit:    Carbuncle of groin  Comments:  referral to general surgery for drainage  Orders:  -     amoxicillin-clavulanate (AUGMENTIN) 875-125 mg per tablet; Take 1 tablet by mouth every 12 (twelve) hours for 10 days    Cellulitis of other specified site  Comments:  go to the ER for any worsening, tylenol for fever  Orders:  -     amoxicillin-clavulanate (AUGMENTIN) 875-125 mg per tablet; Take 1 tablet by mouth every 12 (twelve) hours for 10 days    Class 3 severe obesity due to excess calories without serious comorbidity with body mass index (BMI) of 40 0 to 44 9 in [facility])  Comments:  pt counseled on diet and exercise          Subjective:      Patient ID: Lukas Munoz is a 28 y o  male  ER follow up for carbuncle of the right groin, pt had a culture drawn from the wound, pt was given bactrim but states the area is getting bigger, and states he feels sick        The following portions of the patient's history were reviewed and updated as appropriate: allergies, current medications, past family history, past medical history, past social history, past surgical history and problem list     Review of Systems   Constitutional: Positive for chills and fever  Respiratory: Negative for shortness of breath and wheezing  Cardiovascular: Negative for chest pain and palpitations  Objective:      /90 (BP Location: Left arm, Patient Position: Sitting, Cuff Size: Adult)   Temp (!) 102 °F (38 9 °C) (Tympanic)   Ht 6' (1 829 m)   Wt (!) 141 kg (310 lb)   BMI 42 04 kg/m²          Physical Exam   Constitutional: He is oriented to person, place, and time  He appears well-developed and well-nourished  No distress  HENT:   Head: Normocephalic and atraumatic  Eyes: Pupils are equal, round, and reactive to light  Conjunctivae and EOM are normal  No scleral icterus     Neck: Normal range of motion  Neck supple  Cardiovascular: Normal rate, regular rhythm and normal heart sounds  No murmur heard  Pulmonary/Chest: Effort normal and breath sounds normal  No respiratory distress  He has no wheezes  He has no rales  Abdominal: Soft  Bowel sounds are normal  He exhibits no distension and no mass  There is no tenderness  There is no rebound and no guarding  Musculoskeletal: He exhibits no edema  Lymphadenopathy:     He has no cervical adenopathy  Neurological: He is alert and oriented to person, place, and time  He exhibits normal muscle tone  Skin: Skin is warm and dry  He is not diaphoretic  There is erythema  No pallor  Cellulitis, carbuncle right groin   Psychiatric: He has a normal mood and affect  His behavior is normal  Judgment and thought content normal    Nursing note and vitals reviewed

## 2018-10-17 NOTE — ASSESSMENT & PLAN NOTE
- fever secondary to infection  - as been started on empiric antibiotics as listed above  - Tylenol ordered for fever

## 2018-10-17 NOTE — H&P
H&P- Pia Givens 1985, 28 y o  male MRN: [id number]    Unit/Bed#: 406-01 Encounter: [id number]    Primary Care Provider: Lanette Peguero DO   Date and time admitted to hospital: 10/17/2018  4:38 PM        * Abscess of groin, right   Assessment & Plan    - abscess developed this past Saturday and has progressively increased in size and induration  - he was examined at emergency [address] [place] [address] last evening where a culture was obtained and he was placed on Bactrim  - went to his PCP today, was directed to the emergency department  - I & D was performed in emergency department evaluating copious amount of purulent drainage  - patient admitted to medical-surgical observation status  - started on empiric IV antibiotics of cefepime and vancomycin  - surgical consult placed for tomorrow     Fever due to infection   Assessment & Plan    - fever secondary to infection  - as been started on empiric antibiotics as listed above  - Tylenol ordered for fever         History and Physical - [facility] Internal Medicine    Patient Information: Pia Givens 28 y o  male MRN: [id number]  Unit/Bed#: 406-01 Encounter: [id number]  Admitting Physician: Dolly Hernandez PA-C  PCP: Lanette Peguero DO  Date of Admission:  10/17/18    Assessment/Plan:    Hospital Problem List:     Principal Problem:    Abscess of groin, right  Active Problems:    Fever due to infection        VTE Prophylaxis: Heparin  / sequential compression device   Code Status:  Full  POLST: There is no POLST form on file for this patient (pre-hospital)    Anticipated Length of Stay:  Patient will be admitted on an Observation basis with an anticipated length of stay of  < 2 midnights  Justification for Hospital Stay:  Abscess right groin/fever    Total Time for Visit, including Counseling / Coordination of Care: 30 minutes  Greater than 50% of this total time spent on direct patient counseling and coordination of care      Chief Complaint: Abscess right groin/fever    History of Present Illness:    Mustapha Conrad is a 28 y o  obese male with no significant past medical history who presented to the emergency department PCPs office for evaluation of large abscess right proximal anterior thigh with extension into the right inguinal area  Patient noticed a lump on his right groin on Saturday which had progressively become larger and more indurated, was seen at [facility] emergency department last evening were culture was obtained and placed on Bactrim  On this date patient went to his PCPs office, was placed on Augmentin, however, did not fill the prescription at that time due to having fever and chills, he then proceeded to the emergency department this facility  Upon evaluation in the emergency department laboratory studies were obtained, and an I&D procedure was performed on the abscess with evacuation of copious amount of purulent drainage  Patient is being admitted for observation and then placed on empiric antibiotics of cefepime and vancomycin  Surgical consult has been ordered for surgical evaluation tomorrow  Currently the area of measures 14 cm x 7 cm  Review of Systems:    Review of Systems   Constitutional: Positive for chills and fever  Skin: Positive for wound  All other systems reviewed and are negative  Past Medical and Surgical History:     Past Medical History:   Diagnosis Date    COPD (chronic obstructive pulmonary disease) (HCC)     Murmur, heart     Seasonal allergies        History reviewed  No pertinent surgical history  Meds/Allergies:    Prior to Admission medications    Medication Sig Start Date End Date Taking?  Authorizing Provider   sulfamethoxazole-trimethoprim (BACTRIM DS) 800-160 mg per tablet Take 1 tablet by mouth 2 (two) times a day for 7 days smx-tmp DS (BACTRIM) 800-160 mg tabs (1tab q12 D10) 10/16/18 10/23/18 Yes Bipin Holt MD   amoxicillin-clavulanate (AUGMENTIN) 875-125 mg per tablet Take 1 tablet by mouth every 12 (twelve) hours for 10 days 10/17/18 10/27/18  Tito López DO     I have reviewed home medications with patient personally  Allergies: No Known Allergies    Social History:     Marital Status: /Civil Union   Occupation:  Employed  Patient Pre-hospital Living Situation:  Home  Patient Pre-hospital Level of Mobility:  Full  Patient Pre-hospital Diet Restrictions:  None  Substance Use History:   History   Alcohol Use No     Comment: socially     History   Smoking Status    Never Smoker   Smokeless Tobacco    Never Used     History   Drug Use No       Family History:    non-contributory    Physical Exam:     Vitals:   Blood Pressure: 152/69 (10/17/18 1900)  Pulse: 90 (10/17/18 1900)  Temperature: 99 9 °F (37 7 °C) (10/17/18 1900)  Temp Source: Oral (10/17/18 1615)  Respirations: 18 (10/17/18 1900)  Height: 6' (182 9 cm) (10/17/18 1615)  Weight - Scale: (!) 140 kg (309 lb 4 9 oz) (10/17/18 1615)  SpO2: 97 % (10/17/18 1900)    Physical Exam   Constitutional: He is oriented to person, place, and time  He appears well-developed and well-nourished  No distress  HENT:   Head: Normocephalic and atraumatic  Mouth/Throat: Oropharynx is clear and moist  No oropharyngeal exudate  Eyes: Pupils are equal, round, and reactive to light  Conjunctivae and EOM are normal  No scleral icterus  Neck: Normal range of motion  Neck supple  No JVD present  No tracheal deviation present  No thyromegaly present  Cardiovascular: Normal rate, regular rhythm and normal heart sounds  Exam reveals no gallop and no friction rub  No murmur heard  Pulmonary/Chest: Effort normal and breath sounds normal  No stridor  No respiratory distress  Abdominal: Soft  Bowel sounds are normal  He exhibits no distension and no mass  There is no tenderness  Musculoskeletal: He exhibits tenderness  Legs:  Lymphadenopathy:     He has no cervical adenopathy     Neurological: He is alert and oriented to person, place, and time  No cranial nerve deficit  Skin: Skin is warm and dry  He is not diaphoretic  Psychiatric: He has a normal mood and affect  His behavior is normal  Judgment and thought content normal            Additional Data:     Lab Results: I have personally reviewed pertinent reports  Results from last 7 days  Lab Units 10/17/18  1728   WBC Thousand/uL 10 63*   HEMOGLOBIN g/dL 13 5   HEMATOCRIT % 40 1   PLATELETS Thousands/uL 166   NEUTROS PCT % 72   LYMPHS PCT % 15   MONOS PCT % 11   EOS PCT % 1       Results from last 7 days  Lab Units 10/17/18  1728   SODIUM mmol/L 136   POTASSIUM mmol/L 4 8   CHLORIDE mmol/L 98*   CO2 mmol/L 31   BUN mg/dL 13   CREATININE mg/dL 0 99   CALCIUM mg/dL 9 2           Imaging: I have personally reviewed pertinent reports  No results found  EKG, Pathology, and Other Studies Reviewed on Admission:   · EKG:     Allscripts / Epic Records Reviewed: Yes     ** Please Note: This note has been constructed using a voice recognition system   **

## 2018-10-18 ENCOUNTER — APPOINTMENT (OUTPATIENT)
Dept: CT IMAGING | Facility: HOSPITAL | Age: 33
DRG: 603 | End: 2018-10-18
Payer: COMMERCIAL

## 2018-10-18 PROBLEM — R50.9 FEVER: Status: ACTIVE | Noted: 2018-10-17

## 2018-10-18 LAB
ALBUMIN SERPL BCP-MCNC: 3.1 G/DL (ref 3.5–5)
ALP SERPL-CCNC: 97 U/L (ref 46–116)
ALT SERPL W P-5'-P-CCNC: 54 U/L (ref 12–78)
ANION GAP SERPL CALCULATED.3IONS-SCNC: 7 MMOL/L (ref 4–13)
APTT PPP: 32 SECONDS (ref 24–36)
AST SERPL W P-5'-P-CCNC: 29 U/L (ref 5–45)
BACTERIA WND AEROBE CULT: ABNORMAL
BASOPHILS # BLD AUTO: 0.03 THOUSANDS/ΜL (ref 0–0.1)
BASOPHILS NFR BLD AUTO: 0 % (ref 0–1)
BILIRUB SERPL-MCNC: 1.1 MG/DL (ref 0.2–1)
BUN SERPL-MCNC: 11 MG/DL (ref 5–25)
CALCIUM SERPL-MCNC: 8.5 MG/DL (ref 8.3–10.1)
CHLORIDE SERPL-SCNC: 102 MMOL/L (ref 100–108)
CO2 SERPL-SCNC: 27 MMOL/L (ref 21–32)
CREAT SERPL-MCNC: 0.8 MG/DL (ref 0.6–1.3)
EOSINOPHIL # BLD AUTO: 0.1 THOUSAND/ΜL (ref 0–0.61)
EOSINOPHIL NFR BLD AUTO: 1 % (ref 0–6)
ERYTHROCYTE [DISTWIDTH] IN BLOOD BY AUTOMATED COUNT: 12.2 % (ref 11.6–15.1)
GFR SERPL CREATININE-BSD FRML MDRD: 117 ML/MIN/1.73SQ M
GLUCOSE P FAST SERPL-MCNC: 94 MG/DL (ref 65–99)
GLUCOSE SERPL-MCNC: 94 MG/DL (ref 65–140)
GRAM STN SPEC: ABNORMAL
GRAM STN SPEC: ABNORMAL
HCT VFR BLD AUTO: 38.3 % (ref 36.5–49.3)
HGB BLD-MCNC: 12.7 G/DL (ref 12–17)
IMM GRANULOCYTES # BLD AUTO: 0.05 THOUSAND/UL (ref 0–0.2)
IMM GRANULOCYTES NFR BLD AUTO: 1 % (ref 0–2)
INR PPP: 1.17 (ref 0.86–1.17)
LYMPHOCYTES # BLD AUTO: 1.29 THOUSANDS/ΜL (ref 0.6–4.47)
LYMPHOCYTES NFR BLD AUTO: 15 % (ref 14–44)
MAGNESIUM SERPL-MCNC: 2.1 MG/DL (ref 1.6–2.6)
MCH RBC QN AUTO: 28.6 PG (ref 26.8–34.3)
MCHC RBC AUTO-ENTMCNC: 33.2 G/DL (ref 31.4–37.4)
MCV RBC AUTO: 86 FL (ref 82–98)
MONOCYTES # BLD AUTO: 1.03 THOUSAND/ΜL (ref 0.17–1.22)
MONOCYTES NFR BLD AUTO: 12 % (ref 4–12)
NEUTROPHILS # BLD AUTO: 6.15 THOUSANDS/ΜL (ref 1.85–7.62)
NEUTS SEG NFR BLD AUTO: 71 % (ref 43–75)
NRBC BLD AUTO-RTO: 0 /100 WBCS
PHOSPHATE SERPL-MCNC: 3.3 MG/DL (ref 2.7–4.5)
PLATELET # BLD AUTO: 148 THOUSANDS/UL (ref 149–390)
PMV BLD AUTO: 9.5 FL (ref 8.9–12.7)
POTASSIUM SERPL-SCNC: 4.3 MMOL/L (ref 3.5–5.3)
PROT SERPL-MCNC: 6.8 G/DL (ref 6.4–8.2)
PROTHROMBIN TIME: 14.3 SECONDS (ref 11.8–14.2)
RBC # BLD AUTO: 4.44 MILLION/UL (ref 3.88–5.62)
SODIUM SERPL-SCNC: 136 MMOL/L (ref 136–145)
WBC # BLD AUTO: 8.65 THOUSAND/UL (ref 4.31–10.16)

## 2018-10-18 PROCEDURE — 85610 PROTHROMBIN TIME: CPT | Performed by: PHYSICIAN ASSISTANT

## 2018-10-18 PROCEDURE — 84100 ASSAY OF PHOSPHORUS: CPT | Performed by: PHYSICIAN ASSISTANT

## 2018-10-18 PROCEDURE — 83036 HEMOGLOBIN GLYCOSYLATED A1C: CPT | Performed by: PHYSICIAN ASSISTANT

## 2018-10-18 PROCEDURE — 85730 THROMBOPLASTIN TIME PARTIAL: CPT | Performed by: PHYSICIAN ASSISTANT

## 2018-10-18 PROCEDURE — 72193 CT PELVIS W/DYE: CPT

## 2018-10-18 PROCEDURE — 99243 OFF/OP CNSLTJ NEW/EST LOW 30: CPT

## 2018-10-18 PROCEDURE — 85025 COMPLETE CBC W/AUTO DIFF WBC: CPT | Performed by: PHYSICIAN ASSISTANT

## 2018-10-18 PROCEDURE — 99233 SBSQ HOSP IP/OBS HIGH 50: CPT | Performed by: INTERNAL MEDICINE

## 2018-10-18 PROCEDURE — 83735 ASSAY OF MAGNESIUM: CPT | Performed by: PHYSICIAN ASSISTANT

## 2018-10-18 PROCEDURE — 80053 COMPREHEN METABOLIC PANEL: CPT | Performed by: PHYSICIAN ASSISTANT

## 2018-10-18 RX ORDER — IBUPROFEN 800 MG/1
800 TABLET ORAL EVERY 8 HOURS SCHEDULED
Status: DISCONTINUED | OUTPATIENT
Start: 2018-10-18 | End: 2018-10-20 | Stop reason: HOSPADM

## 2018-10-18 RX ADMIN — VANCOMYCIN HYDROCHLORIDE 1500 MG: 1 INJECTION, POWDER, LYOPHILIZED, FOR SOLUTION INTRAVENOUS at 04:42

## 2018-10-18 RX ADMIN — CEFAZOLIN SODIUM 2000 MG: 2 SOLUTION INTRAVENOUS at 22:27

## 2018-10-18 RX ADMIN — ACETAMINOPHEN 650 MG: 325 TABLET, FILM COATED ORAL at 05:19

## 2018-10-18 RX ADMIN — IBUPROFEN 800 MG: 800 TABLET ORAL at 14:19

## 2018-10-18 RX ADMIN — ENOXAPARIN SODIUM 40 MG: 40 INJECTION SUBCUTANEOUS at 18:22

## 2018-10-18 RX ADMIN — OXYCODONE HYDROCHLORIDE AND ACETAMINOPHEN 1 TABLET: 5; 325 TABLET ORAL at 09:46

## 2018-10-18 RX ADMIN — VANCOMYCIN HYDROCHLORIDE 1500 MG: 1 INJECTION, POWDER, LYOPHILIZED, FOR SOLUTION INTRAVENOUS at 12:19

## 2018-10-18 RX ADMIN — IOHEXOL 100 ML: 350 INJECTION, SOLUTION INTRAVENOUS at 09:42

## 2018-10-18 RX ADMIN — IBUPROFEN 800 MG: 800 TABLET ORAL at 22:21

## 2018-10-18 RX ADMIN — Medication 2000 MG: at 07:02

## 2018-10-18 NOTE — PROGRESS NOTES
Vancomycin Assessment    Price Naik is a 28 y o  male who is currently receiving vancomycin 1500mg Q8H for skin-soft tissue infection     Relevant clinical data and objective history reviewed:  Creatinine   Date Value Ref Range Status   10/18/2018 0 80 0 60 - 1 30 mg/dL Final     Comment:     Standardized to IDMS reference method   10/17/2018 0 99 0 60 - 1 30 mg/dL Final     Comment:     Standardized to IDMS reference method   04/02/2018 0 81 0 7 - 1 3 MG/DL Final     Comment:     IDMS method performed  The drugs Metamizole, Sulfasalazine, and Sulfapyridine may interfere and  result in false low results  /65 (BP Location: Left arm)   Pulse 89   Temp 98 6 °F (37 °C) (Temporal)   Resp 18   Ht 6' (1 829 m)   Wt (!) 139 kg (305 lb 8 9 oz)   SpO2 98%   BMI 41 44 kg/m²   I/O last 3 completed shifts: In: 48 [IV Piggyback:50]  Out: 7958 [Urine:1550]  Lab Results   Component Value Date/Time    BUN 11 10/18/2018 04:54 AM    BUN 14 04/02/2018 10:20 AM    WBC 8 65 10/18/2018 04:54 AM    WBC 5 1 04/02/2018 10:20 AM    HGB 12 7 10/18/2018 04:54 AM    HGB 13 5 (L) 04/02/2018 10:20 AM    HCT 38 3 10/18/2018 04:54 AM    HCT 38 9 (L) 04/02/2018 10:20 AM    MCV 86 10/18/2018 04:54 AM    MCV 85 1 04/02/2018 10:20 AM     (L) 10/18/2018 04:54 AM     04/02/2018 10:20 AM     Temp Readings from Last 3 Encounters:   10/17/18 98 6 °F (37 °C) (Temporal)   10/17/18 (!) 102 °F (38 9 °C) (Tympanic)   10/16/18 99 1 °F (37 3 °C) (Tympanic)     Vancomycin Days of Therapy: 1    Assessment/Plan  The patient is currently on vancomycin utilizing scheduled dosing based on adjusted body weight (due to obesity)  The patient is currently receiving 1500mg Q8H and is clinically appropriate and dose will be continued  Pharmacy will also follow closely for s/sx of nephrotoxicity, infusion reactions and appropriateness of therapy    BMP and CBC will be ordered per protocol  Plan for trough as patient approaches steady state, prior to the 6th dose at approximately 11:00 on 10/19/18  Due to infection severity, will target a trough of 15-20 (appropriate for most indications)   Pharmacy will continue to follow the patients culture results and clinical progress daily      Clive Yanez, Pharmacist

## 2018-10-18 NOTE — PLAN OF CARE
Problem: DISCHARGE PLANNING - CARE MANAGEMENT  Goal: Discharge to post-acute care or home with appropriate resources  INTERVENTIONS:  - Conduct assessment to determine patient/family and health care team treatment goals, and need for post-acute services based on payer coverage, community resources, and patient preferences, and barriers to discharge  - Address psychosocial, clinical, and financial barriers to discharge as identified in assessment in conjunction with the patient/family and health care team  - Arrange appropriate level of post-acute services according to patient's   needs and preference and payer coverage in collaboration with the physician and health care team  - Communicate with and update the patient/family, physician, and health care team regarding progress on the discharge plan  - Arrange appropriate transportation to post-acute venues      D/c home with possible hhc, will need to reassess d/c plan  Outcome: Progressing

## 2018-10-18 NOTE — CONSULTS
Consultation - General Surgery   Delfin Marie 28 y o  male MRN: [id number]  Unit/Bed#: 406-01 Encounter: [id number]    Assessment/Plan     Assessment:    Abscess right groin with cellulitis, still the area remains quite tender and firm with fluctuance more medial to the right anterior upper thigh  He had incision and drainage performed in the ED last night with copious amounts of pearly in whitish yellow drainage  The patient still remains with the firm area of induration more medial with area of firmness in significant tenderness in the right upper anterior thigh area  The patient did not have an ultrasound performed in the ED  Personally discussed with Dr Shane Loera  BMI 41 44, morbid obesity    Plan:  Obtain CT scan of the lower abdomen and pelvis and right upper thigh with IV contrast today  The patient may need additional surgical incision and drainage of suspected larger area of abscess formation within the groin or upper thigh  Will place the patient on nothing by mouth status  Continue IV antibiotics as ordered  Await wound cultures  Blood cultures x2 were obtained in the ED  Pain medication has been ordered  Tylenol as needed for fever  History of Present Illness     HPI:  Delfin Marie is a 28 y o  male who presents with fever, of 102 swelling and induration with redness and significant tenderness in his right upper thigh for the past 3 days  The patient was seen at [facility] ED 2 days ago  He was placed on Bactrim and discharged that same night  He went to his PCP the following day and was started then on Augmentin, though the patient did not fill the prescription  The patient started having fever and chills  The area was not draining with any discharge at home  He reports that he had a needle aspiration of the right groin area in [facility] 2 nights ago and fluid was sent for culture without report known at this time        An incision and drainage was performed of the right groin area last evening when he came to the ED here at [facility]  The patient was admitted with a fever of 102 degrees last evening in the ED  Review of the ED provider's note find that the patient had copious amounts of whitish yellow drainage from the area  He has been placed on IV antibiotics at time  There is no report of any fever this morning  Currently the patient has been ordered a regular diet  The patient still has a lot of tenderness and very firm area more medial on his right anterior upper thigh area  The patient denies history of previous problems or similar skin infections  He also notes a small area of skin redness on his left medial 5th finger  Patient denies any previous knowledge of MRSA skin infection  He cannot recall any open cuts or insect bites  Patient currently is receiving IV cefepime and IV vancomycin  CBC showed a leukocytosis on October 17 of 10 63, this morning WBC is 8 65  He has been afebrile since last evening  His temperature this morning is 99  General surgery consultation has been requested at this time by the hospitalist physician assistant for further evaluation and management of the patient  Inpatient consult to Acute Care Surgery  Consult performed by: Haresh Rabago ordered by: Pasquale Schrader           Review of Systems 12 point review of systems as described above in the HPI significant for fever, chills and swelling with induration and significant tenderness in his right groin and upper anterior right thigh  Otherwise review of systems entirely denied by the patient  No urinary symptoms  Denies any abdominal pain  No chest pain, shortness breath, or recent cold symptoms  He has had if fever of 102 and then repeated 101 7 last evening  He has been afebrile this morning      Historical Information   Past Medical History:   Diagnosis Date    COPD (chronic obstructive pulmonary disease) (HCC)     Murmur, heart     Seasonal allergies      History reviewed  No pertinent surgical history  Social History   History   Alcohol Use No     Comment: socially     History   Drug Use No     History   Smoking Status    Never Smoker   Smokeless Tobacco    Never Used     Family History: non-contributory    Meds/Allergies   current meds:   Current Facility-Administered Medications   Medication Dose Route Frequency    acetaminophen (TYLENOL) tablet 650 mg  650 mg Oral Q6H PRN    cefepime (MAXIPIME) 2,000 mg in dextrose 5 % 50 mL IVPB  2,000 mg Intravenous Q12H    oxyCODONE-acetaminophen (PERCOCET) 5-325 mg per tablet 1 tablet  1 tablet Oral Q4H PRN    vancomycin (VANCOCIN) 1,500 mg in sodium chloride 0 9 % 500 mL IVPB  15 mg/kg (Adjusted) Intravenous Q8H     No Known Allergies    Objective   First Vitals:   Blood Pressure: 147/71 (10/17/18 1615)  Pulse: 94 (10/17/18 1615)  Temperature: (!) 101 7 °F (38 7 °C) (10/17/18 1615)  Temp Source: Oral (10/17/18 1615)  Respirations: 18 (10/17/18 1615)  Height: 6' (182 9 cm) (10/17/18 1615)  Weight - Scale: (!) 140 kg (309 lb 4 9 oz) (10/17/18 1615)  SpO2: 100 % (10/17/18 1615)    Current Vitals:   Blood Pressure: 147/78 (10/18/18 0757)  Pulse: 88 (10/18/18 0757)  Temperature: 99 °F (37 2 °C) (10/18/18 0757)  Temp Source: Temporal (10/18/18 0757)  Respirations: 18 (10/18/18 0757)  Height: 6' (182 9 cm) (10/17/18 1615)  Weight - Scale: (!) 139 kg (305 lb 8 9 oz) (10/18/18 0541)  SpO2: 98 % (10/18/18 0757)      Intake/Output Summary (Last 24 hours) at 10/18/18 0825  Last data filed at 10/17/18 2341   Gross per 24 hour   Intake               50 ml   Output             1550 ml   Net            -1500 ml       Invasive Devices     Peripheral Intravenous Line            Peripheral IV 10/17/18 Right Antecubital less than 1 day                Physical Exam   Patient is awake and alert  The patient appears uncomfortable but no in acute distress  Skin warm and dry to touch  ENT clear  Pharynx not inflamed    Heart regular rate and rhythm  Lungs clear auscultation  Back without flank tenderness  Abdomen positive bowel sounds are heard  The patient is obese  BMI 41 44  The abdomen is soft  Nontender  No guarding rigidity  No inguinal lymphadenopathy is palpable  Normal genitalia  There is no drainage from the penis  The right groin area finds that there is a small incision in the right groin just medial to the midline of the anterior thigh  There is packing in place and no active drainage at present  The area is warm to touch with skin erythema and extends from the right groin to the right medial upper thigh but not involving the scrotum  The area extends more medial from that area and is quite firm measuring approximately 14 cm in transverse length by 8 cm in width  There is some fluctuation and crepitus with palpation more medial on the anterior thigh  The patient did not have any imaging including ultrasound of the area performed  Calf and thigh muscles are soft and supple  DP pulses in both legs are equal and palpable  No ischemic changes  The scrotum does not show any necrotic skin changes present  Lab Results:   I have personally reviewed pertinent lab results        Lactic acid, plasma   Order: [id number]   Status:  Final result   Visible to patient:  No (Inaccessible in MyChart) Next appt:  10/24/2018 at 04:45 PM in [facility])    Ref Range & Units 10/17/18 1728   LACTIC ACID 0 5 - 2 0 mmol/L 1 2              CBC:   Lab Results   Component Value Date    WBC 8 65 10/18/2018    HGB 12 7 10/18/2018    HCT 38 3 10/18/2018    MCV 86 10/18/2018     (L) 10/18/2018    MCH 28 6 10/18/2018    MCHC 33 2 10/18/2018    RDW 12 2 10/18/2018    MPV 9 5 10/18/2018    NRBC 0 10/18/2018   , CMP:   Lab Results   Component Value Date     10/18/2018    K 4 3 10/18/2018     10/18/2018    CO2 27 10/18/2018    BUN 11 10/18/2018    CREATININE 0 80 10/18/2018    CALCIUM 8 5 10/18/2018    AST 29 10/18/2018    ALT 54 10/18/2018    ALKPHOS 97 10/18/2018    EGFR 117 10/18/2018   , Coagulation:   Lab Results   Component Value Date    INR 1 17 10/18/2018     Imaging: I have personally reviewed pertinent reports  EKG, Pathology, and Other Studies: I have personally reviewed pertinent reports  Counseling / Coordination of Care  Total floor / unit time spent today 45 minutes  Greater than 50% of total time was spent with the patient and / or family counseling and / or coordination of care  A description of the counseling / coordination of care:  Personal examination of patient, review of diagnostic laboratory studies and existing documentation, personal discussion with the consulting general surgeon will examine the patient later today      Brenda Day PA-C

## 2018-10-18 NOTE — UTILIZATION REVIEW
Initial Clinical Review  Admission: Date/Time/Statement:   Admission Orders     Ordered        10/17/18 1816  Place in Observation (expected length of stay for this patient is less than two midnights)  Once             Orders Placed This Encounter   Procedures    Place in Observation (expected length of stay for this patient is less than two midnights)     Standing Status:   Standing     Number of Occurrences:   1     Order Specific Question:   Admitting Physician     Answer:   Sravanthi Ashton [[id number]]     Order Specific Question:   Level of Care     Answer:   Med Surg [16]   ED: Date/Time/Mode of Arrival:   ED Arrival Information     Expected Arrival Acuity Means of Arrival Escorted By Service Admission Type    - 10/17/2018 15:52 Urgent Walk-In Family Member General Medicine Urgent    Arrival Complaint    abscess      Chief Complaint:   Chief Complaint   Patient presents with    Abscess     Saturday night patient noticed a sore bump in right upper groin  Sunday noticed it was getting bigger and continued to increase in size and become very painful  Was seen at [address] [place] [address] ER last evening, had abscess cultured  Called PCP today due to fever and chills and was told to come to ER to seen general surgery  History of Illness:   26-year-old male patient who noted that had a lump on his right groin and was sore 4 days ago  He went to emergency room yesterday was told that he has cellulitis without abscess a small amount of culture was taken which grew out a Staph  No sensitivities  He was put on Bactrim  He follow up with his family doctor today and was placed on Augmentin however did not fill the prescription at that time he had 102 fever and shaking chills  He presents today with 101 7 he has had shaking chills he feels fatigued he has pain in his right groin  The erythema has  Supeceeded  the line of demarcation drawn by the emergency department last night    No headache no blurred vision or double vision cough sore throat or rhinorrhea no chest pain or shortness of breath no nausea vomiting diarrhea abdominal pain no pain testicles urgency frequency or dysuria    The right groin only hurts when he moves holds still it feels better  ED Vital Signs:   ED Triage Vitals   Temperature Pulse Respirations Blood Pressure SpO2   10/17/18 1615 10/17/18 1615 10/17/18 1615 10/17/18 1615 10/17/18 1615   (!) 101 7 °F (38 7 °C) 94 18 147/71 100 %      Temp Source Heart Rate Source Patient Position - Orthostatic VS BP Location FiO2 (%)   10/17/18 1615 10/17/18 1730 10/17/18 1615 10/17/18 1615 --   Oral Monitor Sitting Right arm       Pain Score       10/17/18 1615       Worst Possible Pain        Wt Readings from Last 1 Encounters:   10/18/18 (!) 139 kg (305 lb 8 9 oz)   Vital Signs (abnormal):   T 102  Abnormal Labs/Diagnostic Test Results:   ALB 3 1 TBILI 1 10  PT 14 3   ED Treatment:   Medication Administration from 10/17/2018 1552 to 10/17/2018 1923       Date/Time Order Dose Route Action Action by Comments     10/17/2018 1736 sodium chloride 0 9 % bolus 2,000 mL 2,000 mL Intravenous New Bag Katherine Connelly RN      10/17/2018 1815 vancomycin (VANCOCIN) 2,000 mg in sodium chloride 0 9 % 500 mL IVPB 2,000 mg Intravenous New Bag Katherine Connelly RN      10/17/2018 1702 cefepime (MAXIPIME) 2,000 mg in dextrose 5 % 50 mL IVPB   Intravenous Canceled Entry Balta Potter Pharmacist switched to premix     10/17/2018 1735 lidocaine-epinephrine (XYLOCAINE/EPINEPHRINE) 1 %-1:100,000 injection 10 mL 10 mL Infiltration Given by Other Donis Echols RN given to amy berger pa-c     10/17/2018 1807 cefepime (MAXIPIME) IVPB (premix) 2,000 mg 0 mg Intravenous Stopped Katherine Connelly RN      10/17/2018 1737 cefepime (MAXIPIME) IVPB (premix) 2,000 mg 2,000 mg Intravenous New Bag Donis Echols RN      10/17/2018 1735 acetaminophen (TYLENOL) tablet 650 mg 650 mg Oral Given Donis Echols RN       Past Medical/Surgical History: Active Ambulatory Problems     Diagnosis Date Noted    No Active Ambulatory Problems     Resolved Ambulatory Problems     Diagnosis Date Noted    No Resolved Ambulatory Problems     Past Medical History:   Diagnosis Date    COPD [facility]chronic obstructive pulmonary disease) ([facility])     Murmur, heart     Seasonal allergies    Admitting Diagnosis: Abscess [L02 91]  Fever [R50 9]  Abscess of right groin [L02 214]  Cellulitis of right thigh [L03 115]  Age/Sex: 28 y o  male  Assessment/Plan:   Abscess of groin, right   Assessment & Plan     - abscess developed this past Saturday and has progressively increased in size and induration  - he was examined at emergency department [facility] last evening where a culture was obtained and he was placed on Bactrim    - went to his PCP today, was directed to the emergency department  - I & D was performed in emergency department evaluating copious amount of purulent drainage  - patient admitted to medical-surgical observation status  - started on empiric IV antibiotics of cefepime and vancomycin  - surgical consult placed for tomorrow   Fever due to infection   Assessment & Plan     - fever secondary to infection  - as been started on empiric antibiotics as listed above  - Tylenol ordered for fever   Admission Orders:  MED SURG  CONSULT SURGERY  VENODYNES  Scheduled Meds:   Current Facility-Administered Medications:  acetaminophen 650 mg Oral Q6H PRN Clallam Puentes, PA-C    cefepime 2,000 mg Intravenous Q12H Clallam Puentes, PA-C Last Rate: 2,000 mg (10/18/18 0214)   oxyCODONE-acetaminophen 1 tablet Oral Q4H PRN Clallam Puentes, PA-C    vancomycin 15 mg/kg (Adjusted) Intravenous Q8H Clallam Puentes, PA-C Last Rate: 1,500 mg (10/18/18 1791)     Continuous Infusions:    PRN Meds:   acetaminophen    oxyCODONE-acetaminophen

## 2018-10-18 NOTE — TREATMENT PLAN
CT PELVIS WITH IV CONTRAST - 10/18/18     INDICATION:   Scrotal mass or lump  abscess right groin fold, proximal Lower extremity      IMPRESSION:     Fairly extensive inflammatory changes are seen in the superficial fatty tissues and skin of the right lower flank and groin extending to the medial aspect of the right thigh  No definitive evidence of drainable abscess  No soft tissue gas      There appears to be some scrotal wall thickening/minimal fluid along the right side  This would be better assessed with ultrasound  It might represent a hydrocele  Infectious or inflammatory process could appear this way as well  Again, no evidence   of gas in the soft tissues  No evidence of drainable abscess or gas in the soft tissues  Will advance regular diet  The patient does not need further incisional drainage at this time  Apply warm moist soaks to right groin as needed for comfort  Continue current IV antibiotics    Repeat a m  Labs    Dr Lore Shoemaker, hospitalist physician, asked the patient be put on ibuprofen 800 mg 1 tablet with food Q 8 hours round the clock for inflammation      Will personally discussed with Dr Jose Barba will examine the patient later today    Timothy Dean PA-C

## 2018-10-18 NOTE — PROGRESS NOTES
Progress Note - Khalida Do 1985, 28 y o  male MRN: [id number]    Unit/Bed#: 406-01 Encounter: [id number]    Primary Care Provider: Catrina Benítez DO   Date and time admitted to hospital: 10/17/2018  4:38 PM        * Abscess of groin, right   Assessment & Plan    - abscess developed this past Saturday and has progressively increased in size and induration  - he was examined at emergency [address] [place] [address] last evening where a culture was obtained and he was placed on Bactrim, patient had worsening of the cellulitis on p o  Antibiotics  Patient given IV antibiotics overnight his had only minimal improvement, patient will require greater than 2 midnight stay for ongoing inpatient management, close monitoring of extensive right groin cellulitis  Follow-up cultures, antibiotics narrowed to vancomycin only pending culture  Fever due to infection   Assessment & Plan    - fever secondary to infection  - continue with IV vancomycin  - Tylenol ordered for fever         VTE Pharmacologic Prophylaxis:   Pharmacologic: Enoxaparin (Lovenox)  Mechanical VTE Prophylaxis in Place: Yes    Patient Centered Rounds: I have performed bedside rounds with nursing staff today  Discussions with Specialists or Other Care Team Provider:  Case discussed with general surgical team, plan of care discussed with radiology tech regarding CT ordering  Education and Discussions with Family / Patient:  Plan of care discussed with patient at bedside    Time Spent for Care: 1 hour  More than 50% of total time spent on counseling and coordination of care as described above      Current Length of Stay: 0 day(s)    Current Patient Status: Observation   Certification Statement: The patient, admitted on an observation basis, will now require > 2 midnight hospital stay due to Patient with extensive cellulitis require ongoing IV antibiotics    Discharge Plan / Estimated Discharge Date:  Possible discharge 10/20/2018      Code Status: Level 1 - Full Code      Subjective:   Right groin pain, headache  Objective:     Vitals:   Temp (24hrs), Av 7 °F (37 6 °C), Min:98 6 °F (37 °C), Max:101 7 °F (38 7 °C)    Temp:  [98 6 °F (37 °C)-101 7 °F (38 7 °C)] 99 °F (37 2 °C)  HR:  [82-94] 88  Resp:  [18] 18  BP: (135-152)/(62-78) 147/78  SpO2:  [97 %-100 %] 98 %  Body mass index is 41 44 kg/m²  Input and Output Summary (last 24 hours): Intake/Output Summary (Last 24 hours) at 10/18/18 1506  Last data filed at 10/18/18 0843   Gross per 24 hour   Intake               50 ml   Output             2525 ml   Net            -2475 ml       Physical Exam:     Physical Exam   Constitutional: He is oriented to person, place, and time  HENT:   Head: Normocephalic and atraumatic  Eyes: Pupils are equal, round, and reactive to light  Conjunctivae and EOM are normal  Right eye exhibits no discharge  Left eye exhibits no discharge  Cardiovascular: Normal rate and regular rhythm  Exam reveals no gallop  No murmur heard  Pulmonary/Chest: Effort normal and breath sounds normal  No respiratory distress  He has no wheezes  Musculoskeletal:   Patient with extensive subcutaneous induration right groin fold, tenderness to palpation increased warmth and erythema   Neurological: He is alert and oriented to person, place, and time  No cranial nerve deficit  Coordination normal    Skin: Skin is warm and dry  No rash noted  No erythema  Psychiatric: He has a normal mood and affect  His behavior is normal  Judgment and thought content normal    Nursing note and vitals reviewed          Additional Data:     Labs:      Results from last 7 days  Lab Units 10/18/18  0454   WBC Thousand/uL 8 65   HEMOGLOBIN g/dL 12 7   HEMATOCRIT % 38 3   PLATELETS Thousands/uL 148*   NEUTROS PCT % 71   LYMPHS PCT % 15   MONOS PCT % 12   EOS PCT % 1       Results from last 7 days  Lab Units 10/18/18  0454   SODIUM mmol/L 136   POTASSIUM mmol/L 4 3 CHLORIDE mmol/L 102   CO2 mmol/L 27   BUN mg/dL 11   CREATININE mg/dL 0 80   CALCIUM mg/dL 8 5   ALK PHOS U/L 97   ALT U/L 54   AST U/L 29       Results from last 7 days  Lab Units 10/18/18  0454   INR  1 17       * I Have Reviewed All Lab Data Listed Above  * Additional Pertinent Lab Tests Reviewed:  Bret 66 Admission Reviewed        Recent Cultures (last 7 days):       Results from last 7 days  Lab Units 10/17/18  1818 10/16/18  2010   GRAM STAIN RESULT  2+ Polys  2+ Gram positive cocci in clusters 2+ Polys  1+ Gram positive cocci in clusters   WOUND CULTURE  Culture too young- will reincubate 3+ Growth of Staphylococcus aureus*       Last 24 Hours Medication List:     Current Facility-Administered Medications:  acetaminophen 650 mg Oral Q6H PRN Wanda Miguel PA-C    ibuprofen 800 mg Oral [facility] Lorri Lerner PA-C    oxyCODONE-acetaminophen 1 tablet Oral Q4H PRN Wanda Miguel PA-C    vancomycin 15 mg/kg (Adjusted) Intravenous Q8H Wanda Miguel PA-C Last Rate: 1,500 mg (10/18/18 1219)        Today, Patient Was Seen By: Danica Brothers DO

## 2018-10-18 NOTE — PROGRESS NOTES
The patients Vancomycin therapy has been completed / discontinued  Thank you for this consult;  Pharmacy will sign-off now

## 2018-10-18 NOTE — ASSESSMENT & PLAN NOTE
- abscess developed this past Saturday and has progressively increased in size and induration  - he was examined at emergency [address] [place] [address] last evening where a culture was obtained and he was placed on Bactrim, patient had worsening of the cellulitis on p o  Antibiotics  Patient given IV antibiotics overnight his had only minimal improvement, patient will require greater than 2 midnight stay for ongoing inpatient management, close monitoring of extensive right groin cellulitis  Follow-up cultures, antibiotics narrowed to vancomycin only pending culture

## 2018-10-19 LAB
ALBUMIN SERPL BCP-MCNC: 3.2 G/DL (ref 3.5–5)
ALP SERPL-CCNC: 131 U/L (ref 46–116)
ALT SERPL W P-5'-P-CCNC: 80 U/L (ref 12–78)
ANION GAP SERPL CALCULATED.3IONS-SCNC: 7 MMOL/L (ref 4–13)
AST SERPL W P-5'-P-CCNC: 48 U/L (ref 5–45)
BASOPHILS # BLD AUTO: 0.02 THOUSANDS/ΜL (ref 0–0.1)
BASOPHILS NFR BLD AUTO: 0 % (ref 0–1)
BILIRUB SERPL-MCNC: 0.5 MG/DL (ref 0.2–1)
BUN SERPL-MCNC: 13 MG/DL (ref 5–25)
CALCIUM SERPL-MCNC: 9.1 MG/DL (ref 8.3–10.1)
CHLORIDE SERPL-SCNC: 103 MMOL/L (ref 100–108)
CO2 SERPL-SCNC: 29 MMOL/L (ref 21–32)
CREAT SERPL-MCNC: 0.71 MG/DL (ref 0.6–1.3)
EOSINOPHIL # BLD AUTO: 0.14 THOUSAND/ΜL (ref 0–0.61)
EOSINOPHIL NFR BLD AUTO: 2 % (ref 0–6)
ERYTHROCYTE [DISTWIDTH] IN BLOOD BY AUTOMATED COUNT: 12 % (ref 11.6–15.1)
EST. AVERAGE GLUCOSE BLD GHB EST-MCNC: 100 MG/DL
GFR SERPL CREATININE-BSD FRML MDRD: 122 ML/MIN/1.73SQ M
GLUCOSE SERPL-MCNC: 94 MG/DL (ref 65–140)
HBA1C MFR BLD: 5.1 % (ref 4.2–6.3)
HCT VFR BLD AUTO: 42.9 % (ref 36.5–49.3)
HGB BLD-MCNC: 14 G/DL (ref 12–17)
IMM GRANULOCYTES # BLD AUTO: 0.03 THOUSAND/UL (ref 0–0.2)
IMM GRANULOCYTES NFR BLD AUTO: 1 % (ref 0–2)
LYMPHOCYTES # BLD AUTO: 1.35 THOUSANDS/ΜL (ref 0.6–4.47)
LYMPHOCYTES NFR BLD AUTO: 21 % (ref 14–44)
MAGNESIUM SERPL-MCNC: 2.4 MG/DL (ref 1.6–2.6)
MCH RBC QN AUTO: 28.6 PG (ref 26.8–34.3)
MCHC RBC AUTO-ENTMCNC: 32.6 G/DL (ref 31.4–37.4)
MCV RBC AUTO: 88 FL (ref 82–98)
MONOCYTES # BLD AUTO: 0.6 THOUSAND/ΜL (ref 0.17–1.22)
MONOCYTES NFR BLD AUTO: 9 % (ref 4–12)
NEUTROPHILS # BLD AUTO: 4.35 THOUSANDS/ΜL (ref 1.85–7.62)
NEUTS SEG NFR BLD AUTO: 67 % (ref 43–75)
NRBC BLD AUTO-RTO: 0 /100 WBCS
PHOSPHATE SERPL-MCNC: 3.4 MG/DL (ref 2.7–4.5)
PLATELET # BLD AUTO: 181 THOUSANDS/UL (ref 149–390)
PMV BLD AUTO: 9.4 FL (ref 8.9–12.7)
POTASSIUM SERPL-SCNC: 4.2 MMOL/L (ref 3.5–5.3)
PROT SERPL-MCNC: 7.6 G/DL (ref 6.4–8.2)
RBC # BLD AUTO: 4.9 MILLION/UL (ref 3.88–5.62)
SODIUM SERPL-SCNC: 139 MMOL/L (ref 136–145)
WBC # BLD AUTO: 6.49 THOUSAND/UL (ref 4.31–10.16)

## 2018-10-19 PROCEDURE — 85025 COMPLETE CBC W/AUTO DIFF WBC: CPT | Performed by: PHYSICIAN ASSISTANT

## 2018-10-19 PROCEDURE — 84100 ASSAY OF PHOSPHORUS: CPT | Performed by: PHYSICIAN ASSISTANT

## 2018-10-19 PROCEDURE — 99232 SBSQ HOSP IP/OBS MODERATE 35: CPT | Performed by: INTERNAL MEDICINE

## 2018-10-19 PROCEDURE — 99232 SBSQ HOSP IP/OBS MODERATE 35: CPT

## 2018-10-19 PROCEDURE — 80053 COMPREHEN METABOLIC PANEL: CPT | Performed by: PHYSICIAN ASSISTANT

## 2018-10-19 PROCEDURE — 83735 ASSAY OF MAGNESIUM: CPT | Performed by: PHYSICIAN ASSISTANT

## 2018-10-19 RX ADMIN — ENOXAPARIN SODIUM 40 MG: 40 INJECTION SUBCUTANEOUS at 09:58

## 2018-10-19 RX ADMIN — OXYCODONE HYDROCHLORIDE AND ACETAMINOPHEN 1 TABLET: 5; 325 TABLET ORAL at 18:54

## 2018-10-19 RX ADMIN — IBUPROFEN 800 MG: 800 TABLET ORAL at 21:47

## 2018-10-19 RX ADMIN — IBUPROFEN 800 MG: 800 TABLET ORAL at 05:38

## 2018-10-19 RX ADMIN — CEFAZOLIN SODIUM 2000 MG: 2 SOLUTION INTRAVENOUS at 05:38

## 2018-10-19 RX ADMIN — CEFAZOLIN SODIUM 2000 MG: 2 SOLUTION INTRAVENOUS at 14:50

## 2018-10-19 RX ADMIN — CEFAZOLIN SODIUM 2000 MG: 2 SOLUTION INTRAVENOUS at 21:47

## 2018-10-19 RX ADMIN — IBUPROFEN 800 MG: 800 TABLET ORAL at 14:50

## 2018-10-19 NOTE — PROGRESS NOTES
Progress Note - General Surgery   Chip Browning 28 y o  male MRN: [id number]  Unit/Bed#: 406-01 Encounter: [id number]    Assessment:  Right groin cellulitis with abscess that had I and D performed 2 nights ago in the ED  He continues with drainage from the area     No fluid collection on CT scan imaging that would require further surgical drainage  Leukocytosis has resolved  Antibiotic coverage has been changed to IV Ancef, previous culture grew MSSA  Plan:  Continue present IV antibiotic regimen  Apply warm moist soaks to right groin hourly as needed for comfort  Ibuprofen 800 mg Q 8 hours with food  DVT prophylaxis with bilateral venous compression boots and subcu Lovenox  Personally discussed with the attending hospitalist physician, Dr Shayy Nava  Plan to keep the patient for another 24 hours  He needs the benefit of another day of IV antibiotics and then discharge with another 10 day course of oral antibiotics, probably Keflex at the direction of the hospitalist provider upon discharge  Will remove surgical packing today and leave the wound open to drain on its own  The patient will need follow-up as an outpatient in the general surgeon's office with Dr Ara Carmona next week, located in the medical office building behind the hospital   Office telephone number is [phone]  Subjective/Objective   Chief Complaint:  Right groin redness and swelling is slightly improved according to the patient  He has no report of any fever  The right groin incision that was drained in the ED 2 evenings ago continues with slightly yellow color drainage through the packing  Subjective:  78-year-old white male admitted yesterday with a 3 day history of right groin swelling, redness, and tenderness  He initially sought evaluation in [facility] ED and had needle aspiration  He was sent home on antibiotics  Patient was seen the following day by his family care provider    Patient then sought evaluation here in the ED 2 nights ago and had incision and drainage performed  Wound cultures currently are both too young and needed to be intubated  Initial Gram stain showed 2+ polys with 2+ Gram-positive cocci in clusters  Patient currently is on IV Ancef    Scheduled Meds:  Current Facility-Administered Medications:  acetaminophen 650 mg Oral Q6H PRN Betsey Benton, PA-C    cefazolin 2,000 mg Intravenous Q8H Lorelee Mires, DO Last Rate: 2,000 mg (10/19/18 0538)   enoxaparin 40 mg Subcutaneous Q24H [address] Lorelee Mires, DO    ibuprofen 800 mg Oral [facility] OSIRIS Cardona-CARMINA    oxyCODONE-acetaminophen 1 tablet Oral Q4H PRN Betsey Benton, PA-C      Continuous Infusions:   PRN Meds:   acetaminophen    oxyCODONE-acetaminophen        Scheduled Meds:  Current Facility-Administered Medications:  acetaminophen 650 mg Oral Q6H PRN Betsey Benton, PA-C    cefazolin 2,000 mg Intravenous Q8H Lorelee Mires, DO Last Rate: 2,000 mg (10/19/18 0538)   enoxaparin 40 mg Subcutaneous Q24H [address] Lorelee Mires, DO    ibuprofen 800 mg Oral [facility] Brenda Day PA-C    oxyCODONE-acetaminophen 1 tablet Oral Q4H PRN Betsey Benton, PA-C      Continuous Infusions:   PRN Meds:   acetaminophen    oxyCODONE-acetaminophen    Objective:     Blood pressure 133/75, pulse 76, temperature 98 °F (36 7 °C), temperature source Temporal, resp  rate 18, height 6' (1 829 m), weight (!) 138 kg (304 lb 0 2 oz), SpO2 98 %  ,Body mass index is 41 23 kg/m²  Intake/Output Summary (Last 24 hours) at 10/19/18 0707  Last data filed at 10/18/18 1519   Gross per 24 hour   Intake              500 ml   Output              975 ml   Net             -475 ml       Invasive Devices     Peripheral Intravenous Line            Peripheral IV 10/17/18 Right Antecubital 1 day                Physical Exam:  Patient is awake and alert  No acute distress  The patient does not feel warm to touch   Inspection of the right groin reveals a demarcated over area in the right upper proximal medial thigh to the lateral border of the scrotum  The area is less swollen and less erythematous  The border of erythema seems to be within the area that was marked out with pending  There still is an area of firmness towards the midline of the right upper thigh  No skin changes noted on the scrotum  The area in the upper thigh is tender to touch though not frankly warm to palpation  There is a small incision with surgical packing  Lab, Imaging and other studies:  I have personally reviewed pertinent lab results   ,     Wound culture and Gram stain   Order: [id number]   Status:  Preliminary result   Visible to patient:  No (Not Released) Next appt:  10/24/2018 at 04:45 [place] [facility] [place]   Specimen Information: Groin;  Wound        Wound Culture Culture too young- will reincubate              GRAM STAIN RESULT  2+ Polys      2+ Gram positive cocci in clusters               Specimen Collected: 10/17/18 18:18 Last Resulted: 10/18/18 14:57                  CBC:   Lab Results   Component Value Date    WBC 6 49 10/19/2018    HGB 14 0 10/19/2018    HCT 42 9 10/19/2018    MCV 88 10/19/2018     10/19/2018    MCH 28 6 10/19/2018    MCHC 32 6 10/19/2018    RDW 12 0 10/19/2018    MPV 9 4 10/19/2018    NRBC 0 10/19/2018     VTE Pharmacologic Prophylaxis: Enoxaparin (Lovenox)  VTE Mechanical Prophylaxis: sequential compression device     Brenda Day PA-C

## 2018-10-19 NOTE — ASSESSMENT & PLAN NOTE
-abscess is slowly improving, continue with IV Ancef  Anticipate discharge in the next 24 to 48 hours

## 2018-10-19 NOTE — PROGRESS NOTES
Progress Note - Gail Babcock 1985, 28 y o  male MRN: [id number]    Unit/Bed#: 406-01 Encounter: [id number]    Primary Care Provider: Christian Verdin DO   Date and time admitted to hospital: 10/17/2018  4:38 PM        * Abscess of groin, right   Assessment & Plan    -abscess is slowly improving, continue with IV Ancef  Anticipate discharge in the next 24 to 48 hours  Fever   Assessment & Plan    - fever secondary to infection  - fever resolved     Cellulitis of right thigh   Assessment & Plan    Area of cellulitis surrounding abscess is markedly improved today, continue with IV Ancef         VTE Pharmacologic Prophylaxis:   Pharmacologic: Enoxaparin (Lovenox)  Mechanical VTE Prophylaxis in Place: Yes    Patient Centered Rounds: I have performed bedside rounds with nursing staff today  Discussions with Specialists or Other Care Team Provider:  Plan of care discussed with surgical team    Education and Discussions with Family / Patient:  Plan of care discussed with nursing staff and with patient at bedside    Time Spent for Care: 20 minutes  More than 50% of total time spent on counseling and coordination of care as described above  Current Length of Stay: 1 day(s)    Current Patient Status: Inpatient   Certification Statement: The patient will continue to require additional inpatient hospital stay due to Patient requires IV antibiotics, failed outpatient antibiotic therapy    Discharge Plan / Estimated Discharge Date:  10/20/2018 or 10/21/2018      Code Status: Level 1 - Full Code      Subjective:   Minimal right groin pain, overall improved, minimal headache    Objective:     Vitals:   Temp (24hrs), Av 1 °F (36 7 °C), Min:97 9 °F (36 6 °C), Max:98 5 °F (36 9 °C)    Temp:  [97 9 °F (36 6 °C)-98 5 °F (36 9 °C)] 98 5 °F (36 9 °C)  HR:  [76-88] 78  Resp:  [18] 18  BP: (126-142)/(58-77) 142/77  SpO2:  [96 %-98 %] 97 %  Body mass index is 41 23 kg/m²       Input and Output Summary (last 24 hours): Intake/Output Summary (Last 24 hours) at 10/19/18 1347  Last data filed at 10/19/18 0920   Gross per 24 hour   Intake              740 ml   Output              700 ml   Net               40 ml       Physical Exam:     Physical Exam   Constitutional: He is oriented to person, place, and time  He appears well-developed and well-nourished  No distress  Pulmonary/Chest: Effort normal and breath sounds normal  No respiratory distress  He has no wheezes  Musculoskeletal: Normal range of motion  He exhibits no edema, tenderness or deformity  Neurological: He is alert and oriented to person, place, and time  No cranial nerve deficit  Coordination normal    Skin: He is not diaphoretic  Psychiatric: He has a normal mood and affect  His behavior is normal  Judgment and thought content normal    Nursing note and vitals reviewed  Additional Data:     Labs:      Results from last 7 days  Lab Units 10/19/18  0526   WBC Thousand/uL 6 49   HEMOGLOBIN g/dL 14 0   HEMATOCRIT % 42 9   PLATELETS Thousands/uL 181   NEUTROS PCT % 67   LYMPHS PCT % 21   MONOS PCT % 9   EOS PCT % 2       Results from last 7 days  Lab Units 10/19/18  0526   SODIUM mmol/L 139   POTASSIUM mmol/L 4 2   CHLORIDE mmol/L 103   CO2 mmol/L 29   BUN mg/dL 13   CREATININE mg/dL 0 71   CALCIUM mg/dL 9 1   ALK PHOS U/L 131*   ALT U/L 80*   AST U/L 48*       Results from last 7 days  Lab Units 10/18/18  0454   INR  1 17       * I Have Reviewed All Lab Data Listed Above  * Additional Pertinent Lab Tests Reviewed: Bret [address] Admission Reviewed      Recent Cultures (last 7 days):       Results from last 7 days  Lab Units 10/17/18  1818 10/17/18  1728 10/16/18  2010   BLOOD CULTURE   --  No Growth at 24 hrs    No Growth at 24 hrs   --    GRAM STAIN RESULT  2+ Polys  2+ Gram positive cocci in clusters  --  2+ Polys  1+ Gram positive cocci in clusters   WOUND CULTURE  2+ Growth of Staphylococcus aureus*  --  3+ Growth of Staphylococcus aureus*       Last 24 Hours Medication List:     Current Facility-Administered Medications:  acetaminophen 650 mg Oral Q6H PRN David Power PA-C    cefazolin 2,000 mg Intravenous Q8H Pradeep Olvera DO Last Rate: 2,000 mg (10/19/18 0538)   enoxaparin 40 mg Subcutaneous Q24H [address] Pradeep Olvera DO    ibuprofen 800 mg Oral [facility] SHAMA Vieyra    oxyCODONE-acetaminophen 1 tablet Oral Q4H PRN David Power PA-C         Today, Patient Was Seen By: Pradeep Olvera DO

## 2018-10-19 NOTE — DISCHARGE INSTRUCTIONS
Leave right groin incision open to air  May cover if any increased drainage with gauze dressing  May shower, pat incision dry  You will be discharged home on oral antibiotics, make sure that you complete the full course of the medication  Follow-up in the office next week with Dr Lashawn Longoria, partner with Dr Tanya Rodriguez  Outpatient General surgery office is located in the medical office building behind the hospital   Please call the office to schedule an appointment at your convenience [phone]      Duane Brooks PA-C

## 2018-10-20 VITALS
RESPIRATION RATE: 16 BRPM | HEART RATE: 72 BPM | BODY MASS INDEX: 40.91 KG/M2 | TEMPERATURE: 98.1 F | WEIGHT: 302.03 LBS | SYSTOLIC BLOOD PRESSURE: 146 MMHG | HEIGHT: 72 IN | OXYGEN SATURATION: 96 % | DIASTOLIC BLOOD PRESSURE: 88 MMHG

## 2018-10-20 PROBLEM — R50.9 FEVER: Status: RESOLVED | Noted: 2018-10-17 | Resolved: 2018-10-20

## 2018-10-20 LAB
ALBUMIN SERPL BCP-MCNC: 3 G/DL (ref 3.5–5)
ALP SERPL-CCNC: 121 U/L (ref 46–116)
ALT SERPL W P-5'-P-CCNC: 66 U/L (ref 12–78)
ANION GAP SERPL CALCULATED.3IONS-SCNC: 7 MMOL/L (ref 4–13)
AST SERPL W P-5'-P-CCNC: 34 U/L (ref 5–45)
BACTERIA WND AEROBE CULT: ABNORMAL
BASOPHILS # BLD AUTO: 0.04 THOUSANDS/ΜL (ref 0–0.1)
BASOPHILS NFR BLD AUTO: 1 % (ref 0–1)
BILIRUB SERPL-MCNC: 0.4 MG/DL (ref 0.2–1)
BUN SERPL-MCNC: 11 MG/DL (ref 5–25)
CALCIUM SERPL-MCNC: 8.8 MG/DL (ref 8.3–10.1)
CHLORIDE SERPL-SCNC: 104 MMOL/L (ref 100–108)
CO2 SERPL-SCNC: 28 MMOL/L (ref 21–32)
CREAT SERPL-MCNC: 0.71 MG/DL (ref 0.6–1.3)
EOSINOPHIL # BLD AUTO: 0.15 THOUSAND/ΜL (ref 0–0.61)
EOSINOPHIL NFR BLD AUTO: 3 % (ref 0–6)
ERYTHROCYTE [DISTWIDTH] IN BLOOD BY AUTOMATED COUNT: 12.2 % (ref 11.6–15.1)
GFR SERPL CREATININE-BSD FRML MDRD: 122 ML/MIN/1.73SQ M
GLUCOSE SERPL-MCNC: 93 MG/DL (ref 65–140)
GRAM STN SPEC: ABNORMAL
GRAM STN SPEC: ABNORMAL
HCT VFR BLD AUTO: 40.2 % (ref 36.5–49.3)
HGB BLD-MCNC: 13.4 G/DL (ref 12–17)
IMM GRANULOCYTES # BLD AUTO: 0.01 THOUSAND/UL (ref 0–0.2)
IMM GRANULOCYTES NFR BLD AUTO: 0 % (ref 0–2)
LYMPHOCYTES # BLD AUTO: 1.5 THOUSANDS/ΜL (ref 0.6–4.47)
LYMPHOCYTES NFR BLD AUTO: 30 % (ref 14–44)
MAGNESIUM SERPL-MCNC: 2.1 MG/DL (ref 1.6–2.6)
MCH RBC QN AUTO: 28.7 PG (ref 26.8–34.3)
MCHC RBC AUTO-ENTMCNC: 33.3 G/DL (ref 31.4–37.4)
MCV RBC AUTO: 86 FL (ref 82–98)
MONOCYTES # BLD AUTO: 0.44 THOUSAND/ΜL (ref 0.17–1.22)
MONOCYTES NFR BLD AUTO: 9 % (ref 4–12)
NEUTROPHILS # BLD AUTO: 2.91 THOUSANDS/ΜL (ref 1.85–7.62)
NEUTS SEG NFR BLD AUTO: 57 % (ref 43–75)
NRBC BLD AUTO-RTO: 0 /100 WBCS
PHOSPHATE SERPL-MCNC: 3.6 MG/DL (ref 2.7–4.5)
PLATELET # BLD AUTO: 180 THOUSANDS/UL (ref 149–390)
PMV BLD AUTO: 9.4 FL (ref 8.9–12.7)
POTASSIUM SERPL-SCNC: 4.1 MMOL/L (ref 3.5–5.3)
PROT SERPL-MCNC: 7.1 G/DL (ref 6.4–8.2)
RBC # BLD AUTO: 4.67 MILLION/UL (ref 3.88–5.62)
SODIUM SERPL-SCNC: 139 MMOL/L (ref 136–145)
WBC # BLD AUTO: 5.05 THOUSAND/UL (ref 4.31–10.16)

## 2018-10-20 PROCEDURE — 80053 COMPREHEN METABOLIC PANEL: CPT | Performed by: PHYSICIAN ASSISTANT

## 2018-10-20 PROCEDURE — 85025 COMPLETE CBC W/AUTO DIFF WBC: CPT | Performed by: PHYSICIAN ASSISTANT

## 2018-10-20 PROCEDURE — 99239 HOSP IP/OBS DSCHRG MGMT >30: CPT | Performed by: INTERNAL MEDICINE

## 2018-10-20 PROCEDURE — 83735 ASSAY OF MAGNESIUM: CPT | Performed by: PHYSICIAN ASSISTANT

## 2018-10-20 PROCEDURE — 84100 ASSAY OF PHOSPHORUS: CPT | Performed by: PHYSICIAN ASSISTANT

## 2018-10-20 RX ORDER — CEPHALEXIN 500 MG/1
500 CAPSULE ORAL EVERY 6 HOURS SCHEDULED
Qty: 40 CAPSULE | Refills: 0 | Status: SHIPPED | OUTPATIENT
Start: 2018-10-20 | End: 2018-10-30

## 2018-10-20 RX ORDER — OXYCODONE HYDROCHLORIDE AND ACETAMINOPHEN 5; 325 MG/1; MG/1
1 TABLET ORAL EVERY 4 HOURS PRN
Qty: 10 TABLET | Refills: 0 | Status: SHIPPED | OUTPATIENT
Start: 2018-10-20 | End: 2018-10-30

## 2018-10-20 RX ORDER — ACETAMINOPHEN 325 MG/1
TABLET ORAL
Qty: 30 TABLET | Refills: 0 | Status: SHIPPED | OUTPATIENT
Start: 2018-10-20 | End: 2019-12-06 | Stop reason: ALTCHOICE

## 2018-10-20 RX ORDER — IBUPROFEN 800 MG/1
800 TABLET ORAL EVERY 8 HOURS SCHEDULED
Qty: 12 TABLET | Refills: 0 | Status: SHIPPED | OUTPATIENT
Start: 2018-10-20 | End: 2019-12-06 | Stop reason: ALTCHOICE

## 2018-10-20 RX ADMIN — IBUPROFEN 800 MG: 800 TABLET ORAL at 05:14

## 2018-10-20 RX ADMIN — CEFAZOLIN SODIUM 2000 MG: 2 SOLUTION INTRAVENOUS at 05:16

## 2018-10-20 RX ADMIN — ENOXAPARIN SODIUM 40 MG: 40 INJECTION SUBCUTANEOUS at 08:21

## 2018-10-20 NOTE — PLAN OF CARE
Problem: PAIN - ADULT  Goal: Verbalizes/displays adequate comfort level or baseline comfort level  Interventions:  - Encourage patient to monitor pain and request assistance  - Assess pain using appropriate pain scale  - Administer analgesics based on type and severity of pain and evaluate response  - Implement non-pharmacological measures as appropriate and evaluate response  - Consider cultural and social influences on pain and pain management  - Notify physician/advanced practitioner if interventions unsuccessful or patient reports new pain   Outcome: Progressing      Problem: INFECTION - ADULT  Goal: Absence or prevention of progression during hospitalization  INTERVENTIONS:  - Assess and monitor for signs and symptoms of infection  - Monitor lab/diagnostic results  - Monitor all insertion sites, i e  indwelling lines, tubes, and drains  - [place] appropriate cooling/warming therapies per order  - Administer medications as ordered  - Instruct and encourage patient and family to use good hand hygiene technique  - Identify and instruct in appropriate isolation precautions for identified infection/condition   Outcome: Progressing      Problem: SAFETY ADULT  Goal: Patient will remain free of falls  INTERVENTIONS:  - Assess patient frequently for physical needs  -  Identify cognitive and physical deficits and behaviors that affect risk of falls    -  [place] fall precautions as indicated by assessment   - Educate patient/family on patient safety including physical limitations  - Instruct patient to call for assistance with activity based on assessment  - Modify environment to reduce risk of injury  - Consider OT/PT consult to assist with strengthening/mobility   Outcome: Progressing    Goal: Maintain or return to baseline ADL function  INTERVENTIONS:  -  Assess patient's ability to carry out ADLs; assess patient's baseline for ADL function and identify physical deficits which impact ability to perform ADLs (bathing, care of mouth/teeth, toileting, grooming, dressing, etc )  - Assess/evaluate cause of self-care deficits   - Assess range of motion  - Assess patient's mobility; develop plan if impaired  - Assess patient's need for assistive devices and provide as appropriate  - Encourage maximum independence but intervene and supervise when necessary  ¯ Involve family in performance of ADLs  ¯ Assess for home care needs following discharge   ¯ Request OT consult to assist with ADL evaluation and planning for discharge  ¯ Provide patient education as appropriate   Outcome: Progressing    Goal: Maintain or return mobility status to optimal level  INTERVENTIONS:  - Assess patient's baseline mobility status (ambulation, transfers, stairs, etc )    - Identify cognitive and physical deficits and behaviors that affect mobility  - Identify mobility aids required to assist with transfers and/or ambulation (gait belt, sit-to-stand, lift, walker, cane, etc )  - [place] fall precautions as indicated by assessment  - Record patient progress and toleration of activity level on Mobility SBAR; progress patient to next Phase/Stage  - Instruct patient to call for assistance with activity based on assessment  - Request Rehabilitation consult to assist with strengthening/weightbearing, etc    Outcome: Progressing      Problem: DISCHARGE PLANNING  Goal: Discharge to home or other facility with appropriate resources  INTERVENTIONS:  - Identify barriers to discharge w/patient and caregiver  - Arrange for needed discharge resources and transportation as appropriate  - Identify discharge learning needs (meds, wound care, etc )    - Refer to Case Management Department for coordinating discharge planning if the patient needs post-hospital services based on physician/advanced practitioner order or complex needs related to functional status, cognitive ability, or social support system    Outcome: Progressing      Problem: Knowledge Deficit  Goal: Patient/family/caregiver demonstrates understanding of disease process, treatment plan, medications, and discharge instructions  Complete learning assessment and assess knowledge base  Interventions:  - Provide teaching at level of understanding  - Provide teaching via preferred learning methods   Outcome: Progressing      Problem: Nutrition/Hydration-ADULT  Goal: Nutrient/Hydration intake appropriate for improving, restoring or maintaining nutritional needs  Monitor and assess patient's nutrition/hydration status for malnutrition (ex- brittle hair, bruises, dry skin, pale skin and conjunctiva, muscle wasting, smooth red tongue, and disorientation)  Collaborate with interdisciplinary team and initiate plan and interventions as ordered  Monitor patient's weight and dietary intake as ordered or per policy  Utilize nutrition screening tool and intervene per policy  Determine patient's food preferences and provide high-protein, high-caloric foods as appropriate       INTERVENTIONS:  - Monitor oral intake, urinary output, labs, and treatment plans  - Assess nutrition and hydration status and recommend course of action  - Evaluate amount of meals eaten  - Assist patient with eating if necessary   - Allow adequate time for meals  - Recommend/ encourage appropriate diets, oral nutritional supplements, and vitamin/mineral supplements  - Order, calculate, and assess calorie counts as needed  - Assess need for intravenous fluids  - Provide specific nutrition/hydration education as appropriate  - Include patient/family/caregiver in decisions related to nutrition   Outcome: Progressing      Problem: DISCHARGE PLANNING - CARE MANAGEMENT  Goal: Discharge to post-acute care or home with appropriate resources  INTERVENTIONS:  - Conduct assessment to determine patient/family and health care team treatment goals, and need for post-acute services based on payer coverage, community resources, and patient preferences, and barriers to discharge  - Address psychosocial, clinical, and financial barriers to discharge as identified in assessment in conjunction with the patient/family and health care team  - Arrange appropriate level of post-acute services according to patient's   needs and preference and payer coverage in collaboration with the physician and health care team  - Communicate with and update the patient/family, physician, and health care team regarding progress on the discharge plan  - Arrange appropriate transportation to post-acute venues      D/c home with possible hhc, will need to reassess d/c plan   Outcome: Progressing      Problem: SKIN/TISSUE INTEGRITY - ADULT  Goal: Incision(s), wounds(s) or drain site(s) healing without S/S of infection  INTERVENTIONS  - Assess and document risk factors for skin impairment   - Assess and document dressing, incision, wound bed, drain sites and surrounding tissue  - Initiate Nutrition services consult and/or wound management as needed  Outcome: Progressing

## 2018-10-20 NOTE — DISCHARGE SUMMARY
Discharge- Amisha Das 1985, 28 y o  male MRN: [id number]    Unit/Bed#: 406-01 Encounter: [id number]    Primary Care Provider: Dominique Ortiz DO   Date and time admitted to hospital: 10/17/2018  4:38 PM        * Abscess of groin, right   Assessment & Plan    Abscess is markedly improved, still with ongoing drainage, discharged with 10 additional days of p o  Antibiotics    Keflex 500 mg as directed    Follow-up surgical team in the office next week     Cellulitis of right thigh   Assessment & Plan    Improved discharge with oral antibiotics and follow up with general surgical team         Discharging Physician / Practitioner: Audra Pitts DO  PCP: Dominique Ortiz DO  Admission Date:   Admission Orders     Ordered        10/18/18 1859  Inpatient Admission  Once         10/17/18 1816  Place in Observation (expected length of stay for this patient is less than two midnights)  Once             Discharge Date: 10/20/18    Resolved Problems  Date Reviewed: 10/20/2018          Resolved    Fever 10/20/2018     Resolved by  Audra Pitts DO            Hospital Course:     Amisha Das is a 28 y o  male patient who originally presented to the hospital on 10/17/2018 due to right groin abscess  Patient with MSSA right groin abscess, discharged on p o  Keflex  Please see above list of diagnoses and related plan for additional information       Condition at Discharge: good     Discharge Day Visit / Exam:     Subjective:  Improved right groin pain  Vitals: Blood Pressure: 146/88 (10/20/18 0735)  Pulse: 72 (10/20/18 0735)  Temperature: 98 1 °F (36 7 °C) (10/20/18 0735)  Temp Source: Temporal (10/20/18 0735)  Respirations: 16 (10/20/18 0735)  Height: 6' (182 9 cm) (10/17/18 1615)  Weight - Scale: (!) 137 kg (302 lb 0 5 oz) (10/20/18 0532)  SpO2: 96 % (10/20/18 0735)  Exam:   Physical Exam  Decreasing induration and decreased surrounding erythema, patient is in no acute distress, lungs are clear, neurologically intact with good muscle strength in all extremities      Discharge instructions/Information to patient and family:   See after visit summary for information provided to patient and family  Provisions for Follow-Up Care:  See after visit summary for information related to follow-up care and any pertinent home health orders  Disposition:     Home      Planned Readmission: no     Discharge Statement:  I spent 35 minutes discharging the patient  This time was spent on the day of discharge  I had direct contact with the patient on the day of discharge  Greater than 50% of the total time was spent examining patient, answering all patient questions, arranging and discussing plan of care with patient as well as directly providing post-discharge instructions  Additional time then spent on discharge activities  Discharge Medications:  See after visit summary for reconciled discharge medications provided to patient and family        ** Please Note: This note has been constructed using a voice recognition system **

## 2018-10-20 NOTE — ASSESSMENT & PLAN NOTE
Abscess is markedly improved, still with ongoing drainage, discharged with 10 additional days of p o   Antibiotics    Keflex 500 mg as directed    Follow-up surgical team in the office next week

## 2018-10-20 NOTE — SOCIAL WORK
Cm spoke with the patient and they are for d/c to home today  Cm is taking into account that the patient has preferences while planning the d/c needs  Cm plan was discussed with the patient and the patient is agreeable to the plan the patient does understand the importance of follow up care and taking the medications as prescribed  The patient is aware of any symptoms that he should look for when d/c  The patient was d/c to home with no needs and he will make his own appointments

## 2018-10-20 NOTE — NURSING NOTE
Pt discharged to home, d/c instructions given and reviewed with pt, pt verbalized understanding, pt left floor ambulatory

## 2018-10-22 ENCOUNTER — TRANSITIONAL CARE MANAGEMENT (OUTPATIENT)
Dept: FAMILY MEDICINE CLINIC | Facility: CLINIC | Age: 33
End: 2018-10-22

## 2018-10-23 LAB
BACTERIA BLD CULT: NORMAL
BACTERIA BLD CULT: NORMAL

## 2018-10-24 ENCOUNTER — OFFICE VISIT (OUTPATIENT)
Dept: SURGERY | Facility: HOSPITAL | Age: 33
End: 2018-10-24
Payer: COMMERCIAL

## 2018-10-24 ENCOUNTER — OFFICE VISIT (OUTPATIENT)
Dept: FAMILY MEDICINE CLINIC | Facility: CLINIC | Age: 33
End: 2018-10-24
Payer: COMMERCIAL

## 2018-10-24 VITALS
BODY MASS INDEX: 42.39 KG/M2 | WEIGHT: 313 LBS | HEART RATE: 68 BPM | DIASTOLIC BLOOD PRESSURE: 80 MMHG | TEMPERATURE: 97.9 F | SYSTOLIC BLOOD PRESSURE: 145 MMHG | HEIGHT: 72 IN

## 2018-10-24 VITALS
HEIGHT: 72 IN | WEIGHT: 313.6 LBS | DIASTOLIC BLOOD PRESSURE: 82 MMHG | BODY MASS INDEX: 42.48 KG/M2 | SYSTOLIC BLOOD PRESSURE: 146 MMHG | TEMPERATURE: 98.4 F

## 2018-10-24 DIAGNOSIS — L02.234 CARBUNCLE OF GROIN: Primary | ICD-10-CM

## 2018-10-24 DIAGNOSIS — E66.01 CLASS 3 SEVERE OBESITY DUE TO EXCESS CALORIES WITHOUT SERIOUS COMORBIDITY WITH BODY MASS INDEX (BMI) OF 40.0 TO 44.9 IN ADULT (HCC): ICD-10-CM

## 2018-10-24 DIAGNOSIS — L03.818 CELLULITIS OF OTHER SPECIFIED SITE: ICD-10-CM

## 2018-10-24 DIAGNOSIS — L02.214 ABSCESS OF GROIN, RIGHT: Primary | ICD-10-CM

## 2018-10-24 PROCEDURE — 99212 OFFICE O/P EST SF 10 MIN: CPT | Performed by: SURGERY

## 2018-10-24 PROCEDURE — 99496 TRANSJ CARE MGMT HIGH F2F 7D: CPT | Performed by: FAMILY MEDICINE

## 2018-10-24 NOTE — PROGRESS NOTES
Assessment/Plan:    Abscess of groin, right  Recent admission for a right groin abscess status post incision and drainage by ER physician  This today for follow-up  On exam abscess cavity healing well  Minimal induration  No pain  Scant serous drainage  Follow-up elisa Aparicio Diagnoses and all orders for this visit:    Abscess of groin, right          Subjective:      Patient ID: Ira Liang is a 28 y o  male  14-year-old gentleman with recent admission to [address] [facility] with a right groin abscess status post incision drainage by ER physician, presents today for follow-up  Overall doing well  No nausea vomiting  No fevers or chills  Patient states he still feels like areas somewhat hardened in the right groin and there is still scant drainage  Denies any significant pain in the right groin  States that the redness is completely resolved  The following portions of the patient's history were reviewed and updated as appropriate:   He  has a past medical history of Asthma; COPD (chronic obstructive pulmonary disease) ([facility] [address] ); Murmur, heart; and Seasonal allergies  He   Patient Active Problem List    Diagnosis Date Noted    Cellulitis of right thigh      He  has a past surgical history that includes No past surgeries  His family history includes No Known Problems in his father; Seizures in his mother  He  reports that he has never smoked  He has never used smokeless tobacco  He reports that he does not drink alcohol or use drugs    Current Outpatient Prescriptions   Medication Sig Dispense Refill    acetaminophen (TYLENOL) 325 mg tablet Every 6 hours as needed for pain 30 tablet 0    cephalexin (KEFLEX) 500 mg capsule Take 1 capsule (500 mg total) by mouth every 6 (six) hours for 10 days 40 capsule 0    ibuprofen (MOTRIN) 800 mg tablet Take 1 tablet (800 mg total) by mouth every 8 (eight) hours for 4 days 12 tablet 0    oxyCODONE-acetaminophen (PERCOCET) 5-325 mg per tablet Take 1 tablet by mouth every 4 (four) hours as needed for moderate pain for up to 10 days Max Daily Amount: 6 tablets 10 tablet 0     No current facility-administered medications for this visit  He has No Known Allergies       Review of Systems   Constitutional: Negative  Skin: Positive for wound  Negative for color change, pallor and rash  Objective:      /80   Pulse 68   Temp 97 9 °F (36 6 °C)   Ht 6' (1 829 m)   Wt (!) 142 kg (313 lb)   BMI 42 45 kg/m²          Physical Exam   Constitutional: He appears well-developed and well-nourished  No distress  Skin: Skin is warm  No rash noted  He is not diaphoretic  No erythema  No pallor  There is a wound of the right groin consistent with healing abscess cavity  There is a small approximately 2 mm opening with scant serous drainage  No surrounding erythema  No surrounding warmth  Nontender palpation  There is a small amount of induration which I suspect is just residual fluid inflammation and so resolve with time  Vitals reviewed

## 2018-10-24 NOTE — ASSESSMENT & PLAN NOTE
Recent admission for a right groin abscess status post incision and drainage by ER physician  This today for follow-up  On exam abscess cavity healing well  Minimal induration  No pain  Scant serous drainage  Follow-up p r n  Dashawn Side

## 2018-10-24 NOTE — PROGRESS NOTES
Assessment/Plan:    No problem-specific Assessment & Plan notes found for this encounter  Diagnoses and all orders for this visit:    Carbuncle of groin  Comments:  resolved    Cellulitis of other specified site  Comments:  resolved    Class 3 severe obesity due to excess calories without serious comorbidity with body mass index (BMI) of 40 0 to 44 9 in adult [facility])  Comments:  pt counseled on diet and exercise          Subjective:   Date and time hospital follow up call was made:  10/22/2018  9:39 AM  Hospital care reviewed:  Records reviewed  Patient was hopsitalized at:  [address]  Date of admission:  10/17/18  Date of discharge:  10/20/18  Diagnosis:  Abscess of groin, right  Disposition:  Home  Were the patients medicaitons reviewed and updated:  Yes  Current symptoms:  None  Post hospital issues:  None  Should patient be enrolled in anticoag monitoring?:  No  Scheduled for follow up?:  Yes  Patients specialists:  Other (comment)  Other specialists Name:  Dr Cherelle Hamilton   Did you obtain your prescribed medications:  Yes  Do you need help managing your perscriptions or medications:  No  Is transportation to your appointments needed:  No  I have advised the patient to call PCP with any new or worsening symptoms (please type in name along with any credentials):  Jim Jj  Living Arrangements:  Spouse or Significiant other  Are you recieving outpatient services:  No  Are you recieving home care services:  No  Are you using any community resources:  No  Current waiver service:  No  Have you fallen in the last 12 months:  No  Interperter language line required?:  No  Counseling:  Patient          Patient ID: Flash Barreto is a 28 y o  male      Hospital follow up for groin abscess, pt was sent to the ER at last visit and ended up being admitted, it was surgically drained in the hospital, pt is doing well, pt is taking home oral abx, pt saw Dr Cherelle Hamilton this AM        The following portions of the patient's history were reviewed and updated as appropriate: allergies, current medications, past family history, past medical history, past social history, past surgical history and problem list     Review of Systems   Constitutional: Negative for chills and fever  Respiratory: Negative for shortness of breath and wheezing  Cardiovascular: Negative for chest pain and palpitations  Skin: Negative for rash  Objective:      /82 (BP Location: Right arm, Patient Position: Sitting, Cuff Size: Adult)   Temp 98 4 °F (36 9 °C) (Tympanic)   Ht 6' (1 829 m)   Wt (!) 142 kg (313 lb 9 6 oz)   BMI 42 53 kg/m²          Physical Exam   Constitutional: He is oriented to person, place, and time  He appears well-developed and well-nourished  No distress  HENT:   Head: Normocephalic and atraumatic  Eyes: Pupils are equal, round, and reactive to light  Conjunctivae and EOM are normal  No scleral icterus  Neck: Normal range of motion  Neck supple  Cardiovascular: Normal rate, regular rhythm and normal heart sounds  No murmur heard  Pulmonary/Chest: Effort normal and breath sounds normal  No respiratory distress  He has no wheezes  He has no rales  Abdominal: Soft  Bowel sounds are normal  He exhibits no distension and no mass  There is no tenderness  There is no rebound and no guarding  Musculoskeletal: He exhibits no edema  Lymphadenopathy:     He has no cervical adenopathy  Neurological: He is alert and oriented to person, place, and time  He exhibits normal muscle tone  Skin: Skin is warm and dry  No rash noted  He is not diaphoretic  No erythema  No pallor  Psychiatric: He has a normal mood and affect  His behavior is normal  Judgment and thought content normal    Nursing note and vitals reviewed

## 2019-07-10 ENCOUNTER — OFFICE VISIT (OUTPATIENT)
Dept: FAMILY MEDICINE CLINIC | Facility: CLINIC | Age: 34
End: 2019-07-10
Payer: COMMERCIAL

## 2019-07-10 VITALS
BODY MASS INDEX: 42.66 KG/M2 | WEIGHT: 315 LBS | DIASTOLIC BLOOD PRESSURE: 80 MMHG | SYSTOLIC BLOOD PRESSURE: 130 MMHG | HEART RATE: 79 BPM | TEMPERATURE: 97.7 F | OXYGEN SATURATION: 97 % | HEIGHT: 72 IN

## 2019-07-10 DIAGNOSIS — L02.93 CARBUNCLE: Primary | ICD-10-CM

## 2019-07-10 PROCEDURE — 99213 OFFICE O/P EST LOW 20 MIN: CPT | Performed by: FAMILY MEDICINE

## 2019-07-10 PROCEDURE — 1036F TOBACCO NON-USER: CPT | Performed by: FAMILY MEDICINE

## 2019-07-10 PROCEDURE — 3008F BODY MASS INDEX DOCD: CPT | Performed by: FAMILY MEDICINE

## 2019-07-10 RX ORDER — CEPHALEXIN 500 MG/1
500 CAPSULE ORAL EVERY 8 HOURS SCHEDULED
Qty: 30 CAPSULE | Refills: 1 | Status: SHIPPED | OUTPATIENT
Start: 2019-07-10 | End: 2019-07-20

## 2019-07-10 NOTE — PROGRESS NOTES
Assessment/Plan:    No problem-specific Assessment & Plan notes found for this encounter  Diagnoses and all orders for this visit:    Carbuncle  -     cephalexin (KEFLEX) 500 mg capsule; Take 1 capsule (500 mg total) by mouth every 8 (eight) hours for 10 days          Subjective:      Patient ID: Kwasi Beaver is a 27 y o  male  Pt complains of 2 carbuncles one on his abdomen and one on his left leg they started 3 days ago, pt has not tried anything for it    BMI Counseling: Body mass index is 44 38 kg/m²  Discussed the patient's BMI with him  The BMI is above average  BMI counseling and education was provided to the patient  Nutrition recommendations include reducing portion sizes, decreasing overall calorie intake and 3-5 servings of fruits/vegetables daily  Exercise recommendations include exercising 3-5 times per week  The following portions of the patient's history were reviewed and updated as appropriate: allergies, current medications, past family history, past medical history, past social history, past surgical history and problem list     Review of Systems   Constitutional: Negative for chills and fever  Respiratory: Negative for shortness of breath and wheezing  Objective:      /80 (BP Location: Right arm, Patient Position: Sitting, Cuff Size: Adult)   Pulse 79   Temp 97 7 °F (36 5 °C) (Tympanic)   Ht 6' (1 829 m)   Wt (!) 148 kg (327 lb 3 2 oz)   SpO2 97%   BMI 44 38 kg/m²          Physical Exam   Constitutional: He is oriented to person, place, and time  He appears well-developed and well-nourished  No distress  HENT:   Head: Normocephalic and atraumatic  Eyes: Pupils are equal, round, and reactive to light  Conjunctivae and EOM are normal  No scleral icterus  Neck: Normal range of motion  Neck supple  Cardiovascular: Normal rate, regular rhythm and normal heart sounds  No murmur heard    Pulmonary/Chest: Effort normal and breath sounds normal  No respiratory distress  He has no wheezes  He has no rales  Musculoskeletal: He exhibits no edema  Lymphadenopathy:     He has no cervical adenopathy  Neurological: He is alert and oriented to person, place, and time  Skin: Skin is warm and dry  Rash noted  He is not diaphoretic  Psychiatric: He has a normal mood and affect  His behavior is normal  Judgment and thought content normal    Nursing note and vitals reviewed

## 2019-12-06 ENCOUNTER — OFFICE VISIT (OUTPATIENT)
Dept: FAMILY MEDICINE CLINIC | Facility: CLINIC | Age: 34
End: 2019-12-06
Payer: COMMERCIAL

## 2019-12-06 VITALS
WEIGHT: 315 LBS | TEMPERATURE: 97.1 F | BODY MASS INDEX: 42.66 KG/M2 | HEART RATE: 79 BPM | DIASTOLIC BLOOD PRESSURE: 80 MMHG | HEIGHT: 72 IN | SYSTOLIC BLOOD PRESSURE: 130 MMHG | OXYGEN SATURATION: 98 %

## 2019-12-06 DIAGNOSIS — M25.521 RIGHT ELBOW PAIN: Primary | ICD-10-CM

## 2019-12-06 DIAGNOSIS — Z23 ENCOUNTER FOR IMMUNIZATION: ICD-10-CM

## 2019-12-06 PROCEDURE — 3008F BODY MASS INDEX DOCD: CPT | Performed by: FAMILY MEDICINE

## 2019-12-06 PROCEDURE — 1036F TOBACCO NON-USER: CPT | Performed by: FAMILY MEDICINE

## 2019-12-06 PROCEDURE — 99213 OFFICE O/P EST LOW 20 MIN: CPT | Performed by: FAMILY MEDICINE

## 2019-12-06 RX ORDER — PREDNISONE 10 MG/1
TABLET ORAL
Qty: 30 TABLET | Refills: 0 | Status: SHIPPED | OUTPATIENT
Start: 2019-12-06 | End: 2021-05-06 | Stop reason: ALTCHOICE

## 2019-12-06 NOTE — PROGRESS NOTES
Assessment/Plan:    No problem-specific Assessment & Plan notes found for this encounter  Diagnoses and all orders for this visit:    Right elbow pain  -     XR elbow 2 vw right; Future  -     predniSONE 10 mg tablet; 4 pills for 3 days, then 3 pills for 3 days, then 2 pills for 3 days, then 1 pills for 3 days, then stop    Encounter for immunization    Other orders  -     Cancel: influenza vaccine, [id number], quadrivalent, 0 5 mL, preservative-free, for adult and pediatric patients 6 mos+ (AFLURIA, FLUARIX, FLULAVAL, FLUZONE)          PHQ-9 Depression Screening    PHQ-9:    Frequency of the following problems over the past two weeks:       Little interest or pleasure in doing things:  0 - not at all  Feeling down, depressed, or hopeless:  0 - not at all  PHQ-2 Score:  0            Subjective:      Patient ID: Ora Siegel is a 27 y o  male  Pt complains of right elbow pain in the lateral epicondyle radiating to his middle fingers, it started 1 month ago, pt tried aleve which helped a little      The following portions of the patient's history were reviewed and updated as appropriate: allergies, current medications, past family history, past medical history, past social history, past surgical history and problem list     Review of Systems   Constitutional: Negative for chills and fever  Skin: Negative for rash  Objective:    /80   Pulse 79   Temp (!) 97 1 °F (36 2 °C) (Tympanic)   Ht 6' (1 829 m)   Wt (!) 154 kg (340 lb)   SpO2 98%   BMI 46 11 kg/m²      Physical Exam   Constitutional: He is oriented to person, place, and time  He appears well-developed and well-nourished  No distress  HENT:   Head: Normocephalic and atraumatic  Eyes: Pupils are equal, round, and reactive to light  Conjunctivae and EOM are normal  No scleral icterus  Neck: Normal range of motion  Neck supple  Cardiovascular: Normal rate, regular rhythm and normal heart sounds     No murmur heard   Pulmonary/Chest: Effort normal and breath sounds normal  No respiratory distress  He has no wheezes  He has no rales  Musculoskeletal: He exhibits no edema  Lymphadenopathy:     He has no cervical adenopathy  Neurological: He is alert and oriented to person, place, and time  Skin: Skin is warm and dry  He is not diaphoretic  Psychiatric: He has a normal mood and affect  His behavior is normal  Judgment and thought content normal    Nursing note and vitals reviewed  Right Elbow Exam     Tenderness   The patient is experiencing tenderness in the lateral epicondyle  Range of Motion   Extension: normal   Flexion: normal   Pronation: normal   Supination: normal     Muscle Strength   The patient has normal right elbow strength

## 2019-12-08 ENCOUNTER — APPOINTMENT (OUTPATIENT)
Dept: RADIOLOGY | Facility: HOSPITAL | Age: 34
End: 2019-12-08
Payer: COMMERCIAL

## 2019-12-08 DIAGNOSIS — M25.521 RIGHT ELBOW PAIN: ICD-10-CM

## 2019-12-08 PROCEDURE — 73080 X-RAY EXAM OF ELBOW: CPT

## 2020-04-06 NOTE — SOCIAL WORK
Chart reviewed by case management, pt was made aware that he is here as an obs  and obs booklet was given, pt denies any d/c needs, pt will need to be reassessed for additional d/c needs, pt will need a repeat I&d and cultures are pending, pt may need hhc, will continue to follow, pt is independent and lives with his wife, pt has 20 steps outside and 12-14 steps inside, br on the 2nd floor, pt has a rx plan at [facility], pt wife will transport the pt home when stable for d/c, CM reviewed d/c planning process including the following: identifying help at home, patient preference for d/c planning needs, availability of treatment team to discuss questions or concerns patient and/or family may have regarding understanding medications and recognizing signs and symptoms once discharged  CM also encouraged patient to follow up with all recommended appointments after discharge  Patient advised of importance for patient and family to participate in managing patients medical well being  DISPLAY PLAN FREE TEXT

## 2021-03-10 DIAGNOSIS — Z23 ENCOUNTER FOR IMMUNIZATION: ICD-10-CM

## 2021-03-28 ENCOUNTER — IMMUNIZATIONS (OUTPATIENT)
Dept: FAMILY MEDICINE CLINIC | Facility: HOSPITAL | Age: 36
End: 2021-03-28

## 2021-03-28 DIAGNOSIS — Z23 ENCOUNTER FOR IMMUNIZATION: Primary | ICD-10-CM

## 2021-03-28 PROCEDURE — 91300 SARS-COV-2 / COVID-19 MRNA VACCINE (PFIZER-BIONTECH) 30 MCG: CPT

## 2021-03-28 PROCEDURE — 0001A SARS-COV-2 / COVID-19 MRNA VACCINE (PFIZER-BIONTECH) 30 MCG: CPT

## 2021-04-19 ENCOUNTER — IMMUNIZATIONS (OUTPATIENT)
Dept: FAMILY MEDICINE CLINIC | Facility: HOSPITAL | Age: 36
End: 2021-04-19

## 2021-04-19 DIAGNOSIS — Z23 ENCOUNTER FOR IMMUNIZATION: Primary | ICD-10-CM

## 2021-04-19 PROCEDURE — 91300 SARS-COV-2 / COVID-19 MRNA VACCINE (PFIZER-BIONTECH) 30 MCG: CPT

## 2021-04-19 PROCEDURE — 0002A SARS-COV-2 / COVID-19 MRNA VACCINE (PFIZER-BIONTECH) 30 MCG: CPT

## 2021-05-06 ENCOUNTER — APPOINTMENT (OUTPATIENT)
Dept: RADIOLOGY | Facility: CLINIC | Age: 36
End: 2021-05-06
Payer: COMMERCIAL

## 2021-05-06 ENCOUNTER — APPOINTMENT (EMERGENCY)
Dept: NON INVASIVE DIAGNOSTICS | Facility: HOSPITAL | Age: 36
End: 2021-05-06
Payer: COMMERCIAL

## 2021-05-06 ENCOUNTER — HOSPITAL ENCOUNTER (EMERGENCY)
Facility: HOSPITAL | Age: 36
Discharge: HOME | End: 2021-05-06
Admitting: FAMILY MEDICINE
Payer: COMMERCIAL

## 2021-05-06 ENCOUNTER — OFFICE VISIT (OUTPATIENT)
Dept: URGENT CARE | Facility: CLINIC | Age: 36
End: 2021-05-06
Payer: COMMERCIAL

## 2021-05-06 VITALS
RESPIRATION RATE: 18 BRPM | OXYGEN SATURATION: 96 % | TEMPERATURE: 98.3 F | WEIGHT: 312 LBS | SYSTOLIC BLOOD PRESSURE: 178 MMHG | HEART RATE: 62 BPM | DIASTOLIC BLOOD PRESSURE: 92 MMHG | BODY MASS INDEX: 41.16 KG/M2

## 2021-05-06 VITALS
WEIGHT: 312.8 LBS | RESPIRATION RATE: 18 BRPM | OXYGEN SATURATION: 100 % | BODY MASS INDEX: 41.46 KG/M2 | DIASTOLIC BLOOD PRESSURE: 74 MMHG | HEART RATE: 82 BPM | HEIGHT: 73 IN | TEMPERATURE: 97.8 F | SYSTOLIC BLOOD PRESSURE: 152 MMHG

## 2021-05-06 DIAGNOSIS — M25.561 ACUTE PAIN OF RIGHT KNEE: ICD-10-CM

## 2021-05-06 DIAGNOSIS — M17.11 ARTHRITIS OF RIGHT KNEE: Primary | ICD-10-CM

## 2021-05-06 DIAGNOSIS — M25.561 ACUTE PAIN OF RIGHT KNEE: Primary | ICD-10-CM

## 2021-05-06 DIAGNOSIS — I83.891 VARICOSE VEINS OF RIGHT LEG WITH EDEMA: ICD-10-CM

## 2021-05-06 DIAGNOSIS — M79.89 RIGHT LEG SWELLING: ICD-10-CM

## 2021-05-06 PROCEDURE — 99203 OFFICE O/P NEW LOW 30 MIN: CPT

## 2021-05-06 PROCEDURE — 99283 EMERGENCY DEPT VISIT LOW MDM: CPT

## 2021-05-06 PROCEDURE — 93971 EXTREMITY STUDY: CPT

## 2021-05-06 PROCEDURE — 99284 EMERGENCY DEPT VISIT MOD MDM: CPT | Performed by: PHYSICIAN ASSISTANT

## 2021-05-06 PROCEDURE — 93971 EXTREMITY STUDY: CPT | Performed by: SURGERY

## 2021-05-06 PROCEDURE — 73562 X-RAY EXAM OF KNEE 3: CPT

## 2021-05-06 NOTE — PATIENT INSTRUCTIONS
There is degenerative changes noted on x-ray  He states the swelling in the right leg may be slightly worse than normal   He would like to go to the ER for further evaluation and possible imaging  Even use alternative as needed for muscle skeletal pain of the knee after ER visit  Would also recommend Ace wrap and ice  Will place order an epic

## 2021-05-06 NOTE — PROGRESS NOTES
[facility]        NAME: Godwin Munoz is a 38 y o  male  : 1985    MRN: [id number]  DATE: May 6, 2021  TIME: 9:14 AM    Assessment and Plan   Acute pain of right knee [M25 561]  1  Acute pain of right knee  XR knee 3 vw right non injury    Diclofenac Sodium (VOLTAREN) 1 %   2  Right leg swelling  Transfer to other facility         Patient Instructions     Patient Instructions   There is degenerative changes noted on x-ray  He states the swelling in the right leg may be slightly worse than normal   He would like to go to the ER for further evaluation and possible imaging  Even use alternative as needed for muscle skeletal pain of the knee after ER visit  Would also recommend Ace wrap and ice  Will place order an epic  Chief Complaint     Chief Complaint   Patient presents with    Leg Pain     knee pain right started yesterday no injury was sitting at desk and pain started          History of Present Illness   Godwin Munoz presents to the clinic c/o    Patient is a 50-year-old male who complaints of right knee pain that started one day ago  Patient states the right knee pain started while he was sitting at his desk  Denies known injury or trauma, but did state he was walking a lot this weekend at [id number] [facility]  Patient denies pain in posterior knee of calf  Reports pain increasing with ambulation  States pain increasing to 9/10 with ambulation  States he tried aleve with no relief of pain  RLE is more swollen than LLE  Patient states his wife thinks the RLE is worse than his baseline swelling in that leg  Patient states RLE swelling is normal for him or slightly worse and started roughly 3 years ago when a large varicose vein appeared in right thigh  States he received an US study at that time which was negative for DVT  Patient denies CP, dizziness, HA, or SOB  States intermittent numbness does radiate down leg with increased right medial knee pain   Denies numbness or tingling at this time       Review of Systems   Review of Systems   Constitutional: Negative  HENT: Negative  Respiratory: Negative  Cardiovascular: Negative  Gastrointestinal: Negative  Musculoskeletal:        Right knee pain and RLE swelling   Skin: Negative  Neurological: Positive for numbness (intermittent)  Hematological: Negative  Psychiatric/Behavioral: Negative  Current Medications     Long-Term Medications   Medication Sig Dispense Refill    Diclofenac Sodium (VOLTAREN) 1 % Apply 4 g topically 4 (four) times a day for 14 days 224 g 0       Current Allergies     Allergies as of 05/06/2021    (No Known Allergies)            The following portions of the patient's history were reviewed and updated as appropriate: allergies, current medications, past family history, past medical history, past social history, past surgical history and problem list     Objective   /74   Pulse 82   Temp 97 8 °F (36 6 °C)   Resp 18   Ht 6' 1" (1 854 m)   Wt (!) 142 kg (312 lb 12 8 oz)   SpO2 100%   BMI 41 27 kg/m²        Physical Exam     Physical Exam  Vitals signs and nursing note reviewed  Constitutional:       General: He is not in acute distress  Appearance: He is not ill-appearing or toxic-appearing  Cardiovascular:      Rate and Rhythm: Normal rate and regular rhythm  Pulses: Normal pulses  Heart sounds: Normal heart sounds, S1 normal and S2 normal  No murmur  Pulmonary:      Effort: Pulmonary effort is normal  No respiratory distress  Breath sounds: Normal breath sounds  Musculoskeletal: Normal range of motion  Legs:    Skin:     General: Skin is warm and dry  Capillary Refill: Capillary refill takes less than 2 seconds  Neurological:      Mental Status: He is alert  Psychiatric:         Mood and Affect: Mood normal          Behavior: Behavior normal  Behavior is cooperative  Thought Content:  Thought content normal          Judgment: Judgment normal

## 2021-05-06 NOTE — ED PROVIDER NOTES
History  Chief Complaint   Patient presents with    Knee Pain     50-year-old male presents the emergency department from an urgent care complaining of right knee pain  He states that they took an x-ray at the urgent care and told him that he likely has arthritis  With a also have concern for possible DVT of the right lower extremity as his right leg is somewhat larger than the left  Does have a history of varicose veins and he reports that he has had chronic right lower extremity swelling since having a previous abscess in the hip/groin area  He appears well in no acute distress  Large bulging varicose veins noted around the knee  No history of trauma    Allergies reviewed          Prior to Admission Medications   Prescriptions Last Dose Informant Patient Reported? Taking? Diclofenac Sodium (VOLTAREN) 1 %   No No   Sig: Apply 4 g topically 4 (four) times a day for 14 days      Facility-Administered Medications: None       Past Medical History:   Diagnosis Date    Asthma     COPD (chronic obstructive pulmonary disease) (HCC)     Murmur, heart     Seasonal allergies        Past Surgical History:   Procedure Laterality Date    NO PAST SURGERIES         Family History   Problem Relation Age of Onset    Seizures Mother     No Known Problems Father      I have reviewed and agree with the history as documented  E-Cigarette/Vaping     E-Cigarette/Vaping Substances     Social History     Tobacco Use    Smoking status: Never Smoker    Smokeless tobacco: Never Used   Substance Use Topics    Alcohol use: No     Comment: socially    Drug use: No       Review of Systems   HENT: Negative for dental problem, facial swelling, hearing loss and tinnitus  Eyes: Negative for pain and visual disturbance  Respiratory: Negative for chest tightness and shortness of breath  Cardiovascular: Negative for chest pain  Gastrointestinal: Negative for abdominal pain     Musculoskeletal: Positive for arthralgias (Right knee pain)  Negative for back pain and neck pain  Neurological: Negative for dizziness and headaches  All other systems reviewed and are negative  Physical Exam  Physical Exam  Vitals signs and nursing note reviewed  Constitutional:       General: He is not in acute distress  Appearance: He is well-developed  He is not ill-appearing  HENT:      Head: Normocephalic and atraumatic  Right Ear: External ear normal       Left Ear: External ear normal       Nose: Nose normal    Eyes:      Pupils: Pupils are equal, round, and reactive to light  Neck:      Musculoskeletal: Normal range of motion and neck supple  Cardiovascular:      Rate and Rhythm: Normal rate and regular rhythm  Heart sounds: Normal heart sounds  No murmur  No friction rub  No gallop  Pulmonary:      Effort: Pulmonary effort is normal  No respiratory distress  Breath sounds: Normal breath sounds  No stridor  No wheezing or rales  Abdominal:      General: Bowel sounds are normal  There is no distension  Palpations: Abdomen is soft  Tenderness: There is no abdominal tenderness  There is no guarding  Musculoskeletal: Normal range of motion  General: No tenderness  Comments: Bulging veins noted around the right knee  No evidence of trauma  Normal range of motion  Skin:     General: Skin is warm  Capillary Refill: Capillary refill takes less than 2 seconds  Neurological:      Mental Status: He is alert and oriented to person, place, and time  Psychiatric:         Behavior: Behavior is cooperative           Vital Signs  ED Triage Vitals [05/06/21 0938]   Temperature Pulse Respirations Blood Pressure SpO2   98 3 °F (36 8 °C) 62 18 (!) 178/92 96 %      Temp Source Heart Rate Source Patient Position - Orthostatic VS BP Location FiO2 (%)   Temporal Monitor Sitting Left arm --      Pain Score       8           Vitals:    05/06/21 0938   BP: (!) 178/92   Pulse: 62   Patient Position - Orthostatic VS: Sitting         Visual Acuity      ED Medications  Medications - No data to display    Diagnostic Studies  Results Reviewed     None                 VAS lower limb venous duplex study, unilateral/limited    (Results Pending)              Procedures  Procedures         ED Course                             SBIRT 22yo+      Most Recent Value   SBIRT (24 yo +)   In order to provide better care to our patients, we are screening all of our patients for alcohol and drug use  Would it be okay to ask you these screening questions? Yes Filed at: 05/06/2021 1023   Initial Alcohol Screen: US AUDIT-C    1  How often do you have a drink containing alcohol?  0 Filed at: 05/06/2021 1023   2  How many drinks containing alcohol do you have on a typical day you are drinking? 0 Filed at: 05/06/2021 1023   3a  Male UNDER 65: How often do you have five or more drinks on one occasion? 0 Filed at: 05/06/2021 1023   3b  FEMALE Any Age, or MALE 65+: How often do you have 4 or more drinks on one occassion? 0 Filed at: 05/06/2021 1023   Audit-C Score  0 Filed at: 05/06/2021 1023   POLI: How many times in the past year have you    Used an illegal drug or used a prescription medication for non-medical reasons? Never Filed at: 05/06/2021 1023                    MDM  Number of Diagnoses or Management Options  Arthritis of right knee:   Varicose veins of right leg with edema:   Diagnosis management comments: Arthritis of right knee DVT study negative  Referred for vascular surgeon orthopedic surgery  Patient educated regarding varicose veins  Patient educated regarding their diagnosis and given return and follow-up instructions  Patient was advised to returned to the ED with worsening symptoms or concerns  Patient is understanding of and in agreement with the treatment plan  There are no questions at the time of discharge         Amount and/or Complexity of Data Reviewed  Tests in the radiology section of CPT®: ordered and reviewed    Risk of Complications, Morbidity, and/or Mortality  Presenting problems: low  Diagnostic procedures: low  Management options: low    Patient Progress  Patient progress: stable      Disposition  Final diagnoses:   Arthritis of right knee   Varicose veins of right leg with edema     Time reflects when diagnosis was documented in both MDM as applicable and the Disposition within this note     Time User Action Codes Description Comment    5/6/2021 10:11 AM Kristabenton Ceballos Add [M17 11] Arthritis of right knee     5/6/2021 10:11 AM Kristabenton Ceballos Add [I83 891] Varicose veins of right leg with edema       ED Disposition     ED Disposition Condition Date/Time Comment    Discharge Stable Thu May 6, 2021 10:49 AM Shira Gomez discharge to home/self care              Follow-up Information     Follow up With Specialties Details Why [address],  [facility]   [facility] [address] [facility]  [phone]            Discharge Medication List as of 5/6/2021 10:49 AM      CONTINUE these medications which have NOT CHANGED    Details   Diclofenac Sodium (VOLTAREN) 1 % Apply 4 g topically 4 (four) times a day for 14 days, Starting Thu 5/6/2021, Until Thu 5/20/2021, Normal               PDMP Review     None          ED Provider  Electronically Signed by           Esther Ferrera PA-C  05/06/21 0867

## 2021-07-02 ENCOUNTER — CONSULT (OUTPATIENT)
Dept: VASCULAR SURGERY | Facility: CLINIC | Age: 36
End: 2021-07-02
Payer: COMMERCIAL

## 2021-07-02 VITALS
DIASTOLIC BLOOD PRESSURE: 78 MMHG | HEART RATE: 73 BPM | TEMPERATURE: 97.8 F | BODY MASS INDEX: 39.6 KG/M2 | HEIGHT: 73 IN | SYSTOLIC BLOOD PRESSURE: 132 MMHG | WEIGHT: 298.8 LBS

## 2021-07-02 DIAGNOSIS — I83.891 SYMPTOMATIC VARICOSE VEINS OF RIGHT LOWER EXTREMITY: Primary | ICD-10-CM

## 2021-07-02 DIAGNOSIS — I83.891 VARICOSE VEINS OF RIGHT LEG WITH EDEMA: ICD-10-CM

## 2021-07-02 PROCEDURE — 1036F TOBACCO NON-USER: CPT | Performed by: PHYSICIAN ASSISTANT

## 2021-07-02 PROCEDURE — 3008F BODY MASS INDEX DOCD: CPT | Performed by: PHYSICIAN ASSISTANT

## 2021-07-02 PROCEDURE — 99243 OFF/OP CNSLTJ NEW/EST LOW 30: CPT | Performed by: PHYSICIAN ASSISTANT

## 2021-07-02 NOTE — PROGRESS NOTES
Assessment/Plan:    Symptomatic varicose veins of right lower extremity  70-year-old male nonsmoker with asthma and longstanding symptomatic large R mid thigh truncal varicosity extending to lateral calf w/lipodermatosclerosis  Patient does not wear compression  -LEVD in ER 5/6/2021 reviewed with no evidence of a RLE acute or chronic DVT or superficial thrombophlebitis  Reflux identified at saphenofemoral junction and mid GSV  Arterial waveforms triphasic   -recommend 3 month trial of conservative measures to include daily use of compression stockings, lower extremity elevation, low-sodium diet, aerobic activity, weight management and skin moisturization  -LEVDR in 3 months  -return to office with surgeon in 3 months with LEVDR for re-evaluation and discussion of surgical options  -instructed to contact the office in the interim with any questions, concerns or new symptoms       Diagnoses and all orders for this visit:    Symptomatic varicose veins of right lower extremity  -     Compression Stocking  -     VAS Lower extremity venous insufficiency duplex, Single leg w/ measurements; Future    Varicose veins of right leg with edema  -     Ambulatory referral to Vascular Surgery          Subjective:      Patient ID: Rhea Ayoub is a 39 y o  male  Pt is new to our office  He was referred here by Briana Ambriz PA-C for varicose veins  Pt had a  LEV done 5/6/2021  Pt c/o bulging pain veins of the right leg that cause his leg to ache,feel tired and heavy and swell  Pt is unable to wear compression stockings because they are too uncomfortable  He has been elevating his legs when he is able  Pt is a non-smoker  70-year-old male nonsmoker with asthma and longstanding symptomatic large R mid thigh truncal varicosity extending to lateral calf w/lipodermatosclerosis referred by ER for evaluation of  Right thigh truncal varicosity    Pt c/o chronic Right lower extremity heaviness, aching, edema and burning pain overlying large R anterior mid thigh truncal varicosity which is exacerbated by standing for prolonged periods of time   Patient denies hx of DVT, PE, hypercoagulable disorder, superficial thrombophlebitis, venous ulceration, bleeding varicosities, recurrent lower extremity cellulitis or stasis dermatitis   Patient reports significant family history of varicose veins in maternal grandmother but denies family history of VTE or hypercoagulable disorder   Patient denies prior venous intervention or evaluation by vascular surgeon  Patient does not wear compression  Pt recently evaluated in ER due to painful varicosity  LEVD in ER 5/6/2021 reviewed with no evidence of a RLE acute or chronic DVT or superficial thrombophlebitis  Reflux identified at saphenofemoral junction and mid GSV  Arterial waveforms triphasic  The following portions of the patient's history were reviewed and updated as appropriate: allergies, current medications, past family history, past medical history, past social history, past surgical history and problem list     Review of Systems   Constitutional: Negative  HENT: Negative  Eyes: Negative  Respiratory: Negative  Cardiovascular: Positive for leg swelling  Painful veins   Gastrointestinal: Negative  Endocrine: Negative  Genitourinary: Negative  Musculoskeletal: Positive for arthralgias  Skin: Negative  Allergic/Immunologic: Positive for environmental allergies  Neurological: Negative  Hematological: Negative  Psychiatric/Behavioral: Negative  I have reviewed and made appropriate changes to the review of systems input by the medical assistant      Vitals:    07/02/21 1430   BP: 132/78   BP Location: Left arm   Patient Position: Sitting   Cuff Size: Standard   Pulse: 73   Temp: 97 8 °F (36 6 °C)   TempSrc: Tympanic   Weight: 136 kg (298 lb 12 8 oz)   Height: 6' 1" (1 854 m)       Patient Active Problem List   Diagnosis    Cellulitis of right thigh    Symptomatic varicose veins of right lower extremity       Past Surgical History:   Procedure Laterality Date    NO PAST SURGERIES         Family History   Problem Relation Age of Onset    Seizures Mother     No Known Problems Father        Social History     Socioeconomic History    Marital status: /Civil Union     Spouse name: Not on file    Number of children: 1    Years of education: Not on file    Highest education level: Not on file   Occupational History     Employer: [employer]   Tobacco Use    Smoking status: Never Smoker    Smokeless tobacco: Never Used   Substance and Sexual Activity    Alcohol use: No     Comment: socially    Drug use: No    Sexual activity: Not on file   Other Topics Concern    Not on file   Social History Narrative    Not on file     Social Determinants of Health     Financial Resource Strain:     Difficulty of Paying Living Expenses:    Food Insecurity:     Worried About Running Out of Food in the Last Year:     [address] in the Last Year:    Transportation Needs:     Lack of Transportation (Medical):      Lack of Transportation (Non-Medical):    Physical Activity:     Days of Exercise per Week:     Minutes of Exercise per Session:    Stress:     Feeling of Stress :    Social Connections:     Frequency of Communication with Friends and Family:     Frequency of Social Gatherings with Friends and Family:     Attends [employer] Services:     Active Member of Clubs or Organizations:     Attends Club or Organization Meetings:     Marital Status:    Intimate Partner Violence:     Fear of Current or Ex-Partner:     Emotionally Abused:     Physically Abused:     Sexually Abused:        No Known Allergies      Current Outpatient Medications:     Diclofenac Sodium (VOLTAREN) 1 %, Apply 4 g topically 4 (four) times a day for 14 days, Disp: 224 g, Rfl: 0    Objective:   imaging study:  L EVD 5/6/2021:   Imaging study reviewed and as described above  See full report below:   Segment        Right                                 Left                            Impression       Valve   Reflux Time  Impression         CFV            Normal (Patent)                       Normal (Patent)    GSV Inguinal                    Reflux         2 31                     GSV Mid Thigh                   Reflux         3 38                              CONCLUSION:  Impression:     RIGHT LOWER LIMB  No evidence of acute or chronic deep vein thrombosis   No evidence of superficial thrombophlebitis noted  Venous incompetency is noted in the saphenofemoral junction and mid thigh  greater saphenous vein levels  Popliteal, posterior tibial and anterior tibial arterial Doppler waveforms are  triphasic  LEFT LOWER LIMB LIMITED  Evaluation shows no evidence of thrombus in the common femoral vein  Doppler evaluation shows a normal response to augmentation maneuvers  /78 (BP Location: Left arm, Patient Position: Sitting, Cuff Size: Standard)   Pulse 73   Temp 97 8 °F (36 6 °C) (Tympanic)   Ht 6' 1" (1 854 m)   Wt 136 kg (298 lb 12 8 oz)   BMI 39 42 kg/m²       RLE    R thigh       Physical Exam  Vitals and nursing note reviewed  Constitutional:       General: He is not in acute distress  Appearance: He is well-developed  He is obese  HENT:      Head: Normocephalic and atraumatic  Eyes:      General: No scleral icterus  Conjunctiva/sclera: Conjunctivae normal       Pupils: Pupils are equal, round, and reactive to light  Neck:      Thyroid: No thyromegaly  Vascular: No carotid bruit or JVD  Trachea: No tracheal deviation  Cardiovascular:      Rate and Rhythm: Normal rate and regular rhythm  Heart sounds: S1 normal and S2 normal  No murmur heard  No friction rub  No gallop  No S3 sounds         Comments: Bilateral lower extremities warm, pink, motor and sensory intact and well perfused without cyanosis, pallor, rubor, tissue loss or hemosiderin staining  Lipodermatosclerosis right lower extremity  Right lower extremity chronically double size of left lower extremity  Large truncal varicosity traversing right mid thigh extending laterally to right calf  No significant left lower extremity varicosities  Pulmonary:      Effort: No respiratory distress  Breath sounds: Normal breath sounds  No stridor  No wheezing, rhonchi or rales  Abdominal:      General: Bowel sounds are normal  There is no distension or abdominal bruit  Palpations: Abdomen is soft  There is no mass or pulsatile mass  Tenderness: There is no abdominal tenderness  There is no rebound  Musculoskeletal:         General: Normal range of motion  Cervical back: Normal range of motion and neck supple  Right lower le+ Edema present  Skin:     General: Skin is warm and dry  Coloration: Skin is not pale  Findings: No erythema or lesion  Neurological:      Mental Status: He is alert and oriented to person, place, and time  Psychiatric:         Mood and Affect: Mood normal          Thought Content:  Thought content normal

## 2021-07-02 NOTE — ASSESSMENT & PLAN NOTE
69-year-old male nonsmoker with asthma and longstanding symptomatic large R mid thigh truncal varicosity extending to lateral calf w/lipodermatosclerosis  Patient does not wear compression  -LEVD in ER 5/6/2021 reviewed with no evidence of a RLE acute or chronic DVT or superficial thrombophlebitis  Reflux identified at saphenofemoral junction and mid GSV    Arterial waveforms triphasic   -recommend 3 month trial of conservative measures to include daily use of compression stockings, lower extremity elevation, low-sodium diet, aerobic activity, weight management and skin moisturization  -LEVDR in 3 months  -return to office with surgeon in 3 months with LEVDR for re-evaluation and discussion of surgical options  -instructed to contact the office in the interim with any questions, concerns or new symptoms

## 2021-07-02 NOTE — PATIENT INSTRUCTIONS
Varicose Veins   WHAT YOU NEED TO KNOW:   What are varicose veins? Varicose veins are veins that become large, twisted, and swollen  They are common on the back of the calves, knees, and thighs  Varicose veins are caused by valves in your veins that do not work properly  This causes blood to collect and increase pressure in the veins of your legs  The increased pressure causes your veins to stretch, get larger, swell, and twist        What increases my risk for varicose veins? · Pregnancy    · A family history of varicose veins    · Being overweight or obese    · Age 48 years or older    · Sitting or standing for long periods of time    · Wearing tight clothing  What are the signs and symptoms of varicose veins? Your symptoms may be worse after you stand or sit for long periods of time  You may have any of the following:  · Blue, purple, or bulging veins in your legs     · Pain, swelling, or muscle cramps in your legs    · Feeling of fatigue or heaviness in your legs  How are varicose veins diagnosed? Your healthcare provider will examine your legs and ask about your medical history  You may need tests, such as a Doppler ultrasound or duplex scan  These tests show your veins and valves, and how your blood is flowing through them  These tests may also show if there is a blockage or blood clot  How are varicose veins treated? The goal of treatment is to decrease symptoms, improve appearance, and prevent further problems  Treatment will depend on which veins are affected and how severe your condition is  You may need procedures to treat or remove your varicose veins  For example, your healthcare provider may inject a solution or use a laser to close the varicose veins  Surgery to remove long veins may also be done  Ask your healthcare provider for more information about procedures used to treat varicose veins  What can I do to manage my symptoms? · Do not sit or stand for long periods of time    This can cause the blood to collect in your legs and make your symptoms worse  Bend or rotate your ankles several times every hour  Walk around for a few minutes every hour to get blood moving in your legs  · Do not cross your legs when you sit  This decreases blood flow to your feet and can make your symptoms worse  · Do not wear tight clothing or shoes  Do not wear high-heeled shoes  Do not wear clothes that are tight around the waist or knees  · Maintain a healthy weight  Being overweight or obese can make your varicose veins worse  Ask your healthcare provider how much you should weigh  Ask him or her to help you create a weight loss plan if you are overweight  · Wear pressure stockings as directed  The stockings are tight and put pressure on your legs  They improve blood flow and help prevent clots  · Elevate your legs  Keep them above the level of your heart for 15 to 30 minutes several times a day  You can also prop the end of your bed up slightly to elevate your legs while you sleep  This will help blood to flow back to your heart  · Get regular exercise  Talk to your healthcare provider about the best exercise plan for you  Exercise can improve blood flow to your legs and feet  When should I seek immediate care? · You have a wound that does not heal or is infected  · You have an injury that has broken your skin and caused your varicose veins to bleed  · Your leg is swollen and hard  · You notice that your legs or feet are turning blue or black  · Your leg feels warm, tender, and painful  It may look swollen and red  When should I contact my healthcare provider? · You have pain in your leg that does not go away or gets worse  · You notice sudden large bruising on your legs  · You have a rash on your leg  · Your symptoms keep you from doing your daily activities  · You have questions or concerns about your condition or care    CARE AGREEMENT:   You have the right to help plan your care  Learn about your health condition and how it may be treated  Discuss treatment options with your caregivers to decide what care you want to receive  You always have the right to refuse treatment  The above information is an  only  It is not intended as medical advice for individual conditions or treatments  Talk to your doctor, nurse or pharmacist before following any medical regimen to see if it is safe and effective for you  © 2017 [address]  Information is for End User's use only and may not be sold, redistributed or otherwise used for commercial purposes  All illustrations and images included in CareNotes® are the copyrighted property of [employer]  or [employer]      -recommend 3 month trial of conservative measures to include daily use of prescription compression stockings, leg elevation, low-salt diet, aerobic activity, weight management and lotion to leg to help promote good skin health  -place your compression stockings on in the morning and remove prior to bed  -we will schedule you for a lower extremity venous reflux study to assess the function of your valves in your veins to help guide treatment options  -return to office with surgeon in 3 months after reflux study for re-evaluation   -please contact the office in the interim with any questions, concerns or new symptoms

## 2021-09-06 ENCOUNTER — HOSPITAL ENCOUNTER (EMERGENCY)
Facility: HOSPITAL | Age: 36
Discharge: HOME | End: 2021-09-07
Attending: EMERGENCY MEDICINE
Payer: COMMERCIAL

## 2021-09-06 ENCOUNTER — APPOINTMENT (EMERGENCY)
Dept: RADIOLOGY | Facility: HOSPITAL | Age: 36
End: 2021-09-06
Payer: COMMERCIAL

## 2021-09-06 DIAGNOSIS — R00.2 PALPITATIONS: ICD-10-CM

## 2021-09-06 DIAGNOSIS — M25.512 ACUTE PAIN OF LEFT SHOULDER: Primary | ICD-10-CM

## 2021-09-06 LAB
ANION GAP SERPL CALCULATED.3IONS-SCNC: 8 MMOL/L (ref 4–13)
BASOPHILS # BLD AUTO: 0 THOUSANDS/ΜL (ref 0–0.1)
BASOPHILS NFR BLD AUTO: 1 % (ref 0–2)
BUN SERPL-MCNC: 17 MG/DL (ref 7–25)
CALCIUM SERPL-MCNC: 8.7 MG/DL (ref 8.6–10.5)
CHLORIDE SERPL-SCNC: 106 MMOL/L (ref 98–107)
CO2 SERPL-SCNC: 27 MMOL/L (ref 21–31)
CREAT SERPL-MCNC: 0.79 MG/DL (ref 0.7–1.3)
EOSINOPHIL # BLD AUTO: 0.2 THOUSAND/ΜL (ref 0–0.61)
EOSINOPHIL NFR BLD AUTO: 3 % (ref 0–5)
ERYTHROCYTE [DISTWIDTH] IN BLOOD BY AUTOMATED COUNT: 13.1 % (ref 11.5–14.5)
GFR SERPL CREATININE-BSD FRML MDRD: 115 ML/MIN/1.73SQ M
GLUCOSE SERPL-MCNC: 102 MG/DL (ref 65–99)
HCT VFR BLD AUTO: 39.9 % (ref 42–47)
HGB BLD-MCNC: 13.4 G/DL (ref 14–18)
LYMPHOCYTES # BLD AUTO: 2.1 THOUSANDS/ΜL (ref 0.6–4.47)
LYMPHOCYTES NFR BLD AUTO: 34 % (ref 21–51)
MCH RBC QN AUTO: 29.7 PG (ref 26–34)
MCHC RBC AUTO-ENTMCNC: 33.6 G/DL (ref 31–37)
MCV RBC AUTO: 88 FL (ref 81–99)
MONOCYTES # BLD AUTO: 0.6 THOUSAND/ΜL (ref 0.17–1.22)
MONOCYTES NFR BLD AUTO: 9 % (ref 2–12)
NEUTROPHILS # BLD AUTO: 3.3 THOUSANDS/ΜL (ref 1.4–6.5)
NEUTS SEG NFR BLD AUTO: 53 % (ref 42–75)
PLATELET # BLD AUTO: 164 THOUSANDS/UL (ref 149–390)
PMV BLD AUTO: 8.2 FL (ref 8.6–11.7)
POTASSIUM SERPL-SCNC: 3.9 MMOL/L (ref 3.5–5.5)
RBC # BLD AUTO: 4.52 MILLION/UL (ref 4.3–5.9)
SODIUM SERPL-SCNC: 141 MMOL/L (ref 134–143)
TROPONIN I SERPL-MCNC: <0.03 NG/ML
TSH SERPL DL<=0.05 MIU/L-ACNC: 1.3 UIU/ML (ref 0.45–5.33)
WBC # BLD AUTO: 6.3 THOUSAND/UL (ref 4.8–10.8)

## 2021-09-06 PROCEDURE — 36415 COLL VENOUS BLD VENIPUNCTURE: CPT | Performed by: EMERGENCY MEDICINE

## 2021-09-06 PROCEDURE — 84484 ASSAY OF TROPONIN QUANT: CPT | Performed by: EMERGENCY MEDICINE

## 2021-09-06 PROCEDURE — 84443 ASSAY THYROID STIM HORMONE: CPT | Performed by: EMERGENCY MEDICINE

## 2021-09-06 PROCEDURE — 85025 COMPLETE CBC W/AUTO DIFF WBC: CPT | Performed by: EMERGENCY MEDICINE

## 2021-09-06 PROCEDURE — 80048 BASIC METABOLIC PNL TOTAL CA: CPT | Performed by: EMERGENCY MEDICINE

## 2021-09-06 PROCEDURE — 99285 EMERGENCY DEPT VISIT HI MDM: CPT | Performed by: EMERGENCY MEDICINE

## 2021-09-06 PROCEDURE — 73030 X-RAY EXAM OF SHOULDER: CPT

## 2021-09-06 PROCEDURE — 99284 EMERGENCY DEPT VISIT MOD MDM: CPT

## 2021-09-06 PROCEDURE — 71046 X-RAY EXAM CHEST 2 VIEWS: CPT

## 2021-09-06 PROCEDURE — 96374 THER/PROPH/DIAG INJ IV PUSH: CPT

## 2021-09-06 RX ORDER — KETOROLAC TROMETHAMINE 30 MG/ML
15 INJECTION, SOLUTION INTRAMUSCULAR; INTRAVENOUS ONCE
Status: COMPLETED | OUTPATIENT
Start: 2021-09-06 | End: 2021-09-06

## 2021-09-06 RX ADMIN — KETOROLAC TROMETHAMINE 15 MG: 30 INJECTION, SOLUTION INTRAMUSCULAR; INTRAVENOUS at 23:22

## 2021-09-07 VITALS
SYSTOLIC BLOOD PRESSURE: 163 MMHG | HEART RATE: 71 BPM | WEIGHT: 299 LBS | BODY MASS INDEX: 39.63 KG/M2 | RESPIRATION RATE: 12 BRPM | OXYGEN SATURATION: 97 % | HEIGHT: 73 IN | DIASTOLIC BLOOD PRESSURE: 92 MMHG | TEMPERATURE: 98.5 F

## 2021-09-07 PROCEDURE — 96375 TX/PRO/DX INJ NEW DRUG ADDON: CPT

## 2021-09-07 RX ORDER — MAGNESIUM HYDROXIDE/ALUMINUM HYDROXICE/SIMETHICONE 120; 1200; 1200 MG/30ML; MG/30ML; MG/30ML
30 SUSPENSION ORAL ONCE
Status: COMPLETED | OUTPATIENT
Start: 2021-09-07 | End: 2021-09-07

## 2021-09-07 RX ORDER — LIDOCAINE HYDROCHLORIDE 20 MG/ML
15 SOLUTION OROPHARYNGEAL ONCE
Status: COMPLETED | OUTPATIENT
Start: 2021-09-07 | End: 2021-09-07

## 2021-09-07 RX ORDER — METOCLOPRAMIDE HYDROCHLORIDE 5 MG/ML
10 INJECTION INTRAMUSCULAR; INTRAVENOUS ONCE
Status: COMPLETED | OUTPATIENT
Start: 2021-09-07 | End: 2021-09-07

## 2021-09-07 RX ADMIN — LIDOCAINE HYDROCHLORIDE 15 ML: 20 SOLUTION ORAL; TOPICAL at 00:33

## 2021-09-07 RX ADMIN — METOCLOPRAMIDE HYDROCHLORIDE 10 MG: 5 INJECTION INTRAMUSCULAR; INTRAVENOUS at 00:37

## 2021-09-07 RX ADMIN — ALUMINUM HYDROXIDE, MAGNESIUM HYDROXIDE, AND SIMETHICONE 30 ML: 200; 200; 20 SUSPENSION ORAL at 00:33

## 2021-09-07 NOTE — ED PROVIDER NOTES
History  Chief Complaint   Patient presents with    Shoulder Pain     With associated LUE numbness that started this AM, patient denies pain at this time but states when he lifts left shoulder it is a 810     23-year-old male presenting for left shoulder pain which she states started little over 12 hours ago upon waking along with right-sided chest pain which he states started approximately 11 hours prior to arrival   He describes some shortness of breath with it but describes it more as a palpitations like feeling  Denies any exertional component to the chest pain  Arm pain is made worse with movement of left shoulder  Denies recent illness, fever, night sweats, chills, sore throat, cough, abdominal pain, nausea vomiting diarrhea constipation dysuria, hematuria  Prior to Admission Medications   Prescriptions Last Dose Informant Patient Reported? Taking? Diclofenac Sodium (VOLTAREN) 1 %   No No   Sig: Apply 4 g topically 4 (four) times a day for 14 days      Facility-Administered Medications: None       Past Medical History:   Diagnosis Date    Asthma     COPD (chronic obstructive pulmonary disease) (HCC)     Murmur, heart     Seasonal allergies        Past Surgical History:   Procedure Laterality Date    NO PAST SURGERIES         Family History   Problem Relation Age of Onset    Seizures Mother     No Known Problems Father      I have reviewed and agree with the history as documented  E-Cigarette/Vaping     E-Cigarette/Vaping Substances     Social History     Tobacco Use    Smoking status: Never Smoker    Smokeless tobacco: Never Used   Substance Use Topics    Alcohol use: No     Comment: socially    Drug use: No       Review of Systems   Respiratory: Positive for chest tightness and shortness of breath  Cardiovascular: Positive for chest pain and palpitations  Musculoskeletal: Positive for arthralgias (L shoulder pain)     All other systems reviewed and are negative  Physical Exam  Physical Exam  Vitals and nursing note reviewed  Constitutional:       General: He is not in acute distress  Appearance: He is well-developed  He is not diaphoretic  HENT:      Head: Normocephalic and atraumatic  Right Ear: External ear normal       Left Ear: External ear normal       Nose: Nose normal    Eyes:      General: No scleral icterus  Right eye: No discharge  Left eye: No discharge  Conjunctiva/sclera: Conjunctivae normal    Cardiovascular:      Rate and Rhythm: Normal rate and regular rhythm  Heart sounds: Normal heart sounds  No murmur heard  No friction rub  No gallop  Pulmonary:      Effort: Pulmonary effort is normal  No respiratory distress  Breath sounds: Normal breath sounds  No wheezing or rales  Abdominal:      General: Bowel sounds are normal  There is no distension  Palpations: Abdomen is soft  There is no mass  Tenderness: There is no abdominal tenderness  There is no guarding  Musculoskeletal:         General: No swelling, tenderness, deformity or signs of injury  Arms:       Cervical back: Normal, normal range of motion and neck supple  Thoracic back: Normal       Lumbar back: Normal       Right lower leg: No edema  Left lower leg: No edema  Comments: Pain with abduction of L arm at shoulder  Full non painful ROM of L elbow and wrist  Radial pulses 2+ B/L  Sensation equal and intact in B/L UEs      No tenderness to palpation of L shoulder/humerus/collarbone   Skin:     General: Skin is warm and dry  Coloration: Skin is not pale  Findings: No erythema or rash  Neurological:      Mental Status: He is alert and oriented to person, place, and time  Psychiatric:         Behavior: Behavior normal          Thought Content:  Thought content normal          Judgment: Judgment normal          Vital Signs  ED Triage Vitals [09/06/21 2229]   Temperature Pulse Respirations Blood Pressure SpO2   98 5 °F (36 9 °C) 79 16 (!) 188/89 100 %      Temp Source Heart Rate Source Patient Position - Orthostatic VS BP Location FiO2 (%)   Temporal Monitor Sitting Left arm --      Pain Score       No Pain           Vitals:    09/06/21 2229 09/06/21 2245 09/07/21 0015 09/07/21 0030   BP: (!) 188/89   163/92   Pulse: 79 77 67 68   Patient Position - Orthostatic VS: Sitting            Visual Acuity      ED Medications  Medications   ketorolac (TORADOL) injection 15 mg (15 mg Intravenous Given 9/6/21 2322)   aluminum-magnesium hydroxide-simethicone (MYLANTA) oral suspension 30 mL (30 mL Oral Given 9/7/21 0033)   Lidocaine Viscous HCl (XYLOCAINE) 2 % mucosal solution 15 mL (15 mL Swish & Spit Given 9/7/21 0033)   metoclopramide (REGLAN) injection 10 mg (10 mg Intravenous Given 9/7/21 0037)       Diagnostic Studies  Results Reviewed     Procedure Component Value Units Date/Time    TSH, 3rd generation with Free T4 reflex [[id number]]  (Normal) Collected: 09/06/21 2319    Lab Status: Final result Specimen: Blood from Arm, Right Updated: 09/06/21 2356     TSH 3RD GENERATON 1 300 uIU/mL     Narrative:      Patients undergoing fluorescein dye angiography may retain small amounts of fluorescein in the body for 48-72 hours post procedure  Samples containing fluorescein can produce falsely depressed TSH values  If the patient had this procedure,a specimen should be resubmitted post fluorescein clearance        Troponin I [[id number]]  (Normal) Collected: 09/06/21 2319    Lab Status: Final result Specimen: Blood from Arm, Right Updated: 09/06/21 2344     Troponin I <0 03 ng/mL     Basic metabolic panel [[id number]]  (Abnormal) Collected: 09/06/21 2319    Lab Status: Final result Specimen: Blood from Arm, Right Updated: 09/06/21 2341     Sodium 141 mmol/L      Potassium 3 9 mmol/L      Chloride 106 mmol/L      CO2 27 mmol/L      ANION GAP 8 mmol/L      BUN 17 mg/dL      Creatinine 0 79 mg/dL      Glucose 102 mg/dL Calcium 8 7 mg/dL      eGFR 115 ml/min/1 73sq m     Narrative:      [place] guidelines for Chronic Kidney Disease (CKD):     Stage 1 with normal or high GFR (GFR > 90 mL/min/1 73 square meters)    Stage 2 Mild CKD (GFR = 60-89 mL/min/1 73 square meters)    Stage 3A Moderate CKD (GFR = 45-59 mL/min/1 73 square meters)    Stage 3B Moderate CKD (GFR = 30-44 mL/min/1 73 square meters)    Stage 4 Severe CKD (GFR = 15-29 mL/min/1 73 square meters)    Stage 5 End Stage CKD (GFR <15 mL/min/1 73 square meters)  Note: GFR calculation is accurate only with a steady state creatinine    CBC and differential [[id number]]  (Abnormal) Collected: 09/06/21 2319    Lab Status: Final result Specimen: Blood from Arm, Right Updated: 09/06/21 2326     WBC 6 30 Thousand/uL      RBC 4 52 Million/uL      Hemoglobin 13 4 g/dL      Hematocrit 39 9 %      MCV 88 fL      MCH 29 7 pg      MCHC 33 6 g/dL      RDW 13 1 %      MPV 8 2 fL      Platelets 031 Thousands/uL      Neutrophils Relative 53 %      Lymphocytes Relative 34 %      Monocytes Relative 9 %      Eosinophils Relative 3 %      Basophils Relative 1 %      Neutrophils Absolute 3 30 Thousands/µL      Lymphocytes Absolute 2 10 Thousands/µL      Monocytes Absolute 0 60 Thousand/µL      Eosinophils Absolute 0 20 Thousand/µL      Basophils Absolute 0 00 Thousands/µL                  XR chest 2 views   ED Interpretation by Chavo Arzate DO (09/06 2330)   No acute cardio/pulmonary disease noted on my interpretation          XR shoulder 2+ views LEFT   ED Interpretation by Chavo Arzate DO (09/06 2330)   No acute fracture or dislocation noted on my interpretation                     Procedures  ECG 12 Lead Documentation Only    Date/Time: 9/7/2021 1:10 AM  Performed by: Chavo Arzate DO  Authorized by: Chavo Arzate DO     Indications / Diagnosis:  Fluttering in chest  Patient location:  ED  Previous ECG:     Previous ECG: Unavailable  Interpretation:     Interpretation: normal    Rate:     ECG rate:  79    ECG rate assessment: normal    Rhythm:     Rhythm: sinus rhythm    Ectopy:     Ectopy: none    QRS:     QRS axis:  Normal    QRS intervals:  Normal  Conduction:     Conduction: normal    ST segments:     ST segments:  Normal  T waves:     T waves: normal               ED Course  ED Course as of Sep 07 0111   Mon Sep 06, 2021   2346 Troponin I: <0 03             HEART Risk Score      Most Recent Value   Heart Score Risk Calculator   History  0 Filed at: 09/07/2021 0108   ECG  0 Filed at: 09/07/2021 0108   Age  0 Filed at: 09/07/2021 0108   Risk Factors  0 Filed at: 09/07/2021 0108   Troponin  0 Filed at: 09/07/2021 0108   HEART Score  0 Filed at: 09/07/2021 0108                                    MDM  Number of Diagnoses or Management Options  Acute pain of left shoulder  Palpitations  Diagnosis management comments: Patient claiming fluttering palpitations feeling in his chest as the main feeling in his chest and belly no chest pain  Also describing pain coming down his left arm when he moves his left shoulder  Patient neurovascularly intact with the left arm  X-rays and cardiac workup unremarkable  Refrral for heart monitor and Orthopedics sent  Disposition  Final diagnoses:   Acute pain of left shoulder   Palpitations     Time reflects when diagnosis was documented in both MDM as applicable and the Disposition within this note     Time User Action Codes Description Comment    9/7/2021  1:07 AM Miriam Wallace Add [M25 512] Acute pain of left shoulder     9/7/2021  1:07 AM Miriam Wallace Add [R00 2] Palpitations       ED Disposition     ED Disposition Condition Date/Time Comment    Discharge Stable Tue Sep 7, 2021  1:07 AM Samantha Buenrostro discharge to home/self care              Follow-up Information     Follow up With Specialties Details Why Contact Info Additional Jackson Villatoro DO Family Medicine [address] [place] [id number]  [phone]       Marlo Lacy  Emergency Department Emergency Medicine Go to  As needed, If symptoms worsen [place] [address]  [phone] Marlo Lacy  Emergency Department    Angel Cheung DO Orthopedic Surgery   [address] [address]  [facility]  [phone]             Patient's Medications   Discharge Prescriptions    No medications on file     Outpatient Discharge Orders   Ambulatory referral to Orthopedic Surgery   Standing Status: Future Standing Exp  Date: 09/07/22      Holter monitor - 24 hour   Standing Status: Future Standing Exp   Date: 09/07/25       PDMP Review     None          ED Provider  Electronically Signed by           Heidy Mart DO  09/07/21 0111

## 2021-10-25 ENCOUNTER — HOSPITAL ENCOUNTER (OUTPATIENT)
Dept: NON INVASIVE DIAGNOSTICS | Facility: HOSPITAL | Age: 36
Discharge: HOME | End: 2021-10-25
Payer: COMMERCIAL

## 2021-10-25 DIAGNOSIS — I83.891 SYMPTOMATIC VARICOSE VEINS OF RIGHT LOWER EXTREMITY: ICD-10-CM

## 2021-10-25 PROCEDURE — 93971 EXTREMITY STUDY: CPT

## 2021-10-25 PROCEDURE — 93971 EXTREMITY STUDY: CPT | Performed by: SURGERY

## 2021-11-30 ENCOUNTER — OFFICE VISIT (OUTPATIENT)
Dept: VASCULAR SURGERY | Facility: CLINIC | Age: 36
End: 2021-11-30
Payer: COMMERCIAL

## 2021-11-30 ENCOUNTER — TELEPHONE (OUTPATIENT)
Dept: VASCULAR SURGERY | Facility: CLINIC | Age: 36
End: 2021-11-30

## 2021-11-30 VITALS
HEART RATE: 63 BPM | SYSTOLIC BLOOD PRESSURE: 120 MMHG | BODY MASS INDEX: 41.75 KG/M2 | WEIGHT: 315 LBS | TEMPERATURE: 98.4 F | HEIGHT: 73 IN | DIASTOLIC BLOOD PRESSURE: 70 MMHG

## 2021-11-30 DIAGNOSIS — I87.2 VENOUS INSUFFICIENCY OF RIGHT LOWER EXTREMITY: ICD-10-CM

## 2021-11-30 DIAGNOSIS — I83.891 SYMPTOMATIC VARICOSE VEINS OF RIGHT LOWER EXTREMITY: Primary | ICD-10-CM

## 2021-11-30 PROCEDURE — 99215 OFFICE O/P EST HI 40 MIN: CPT | Performed by: SURGERY

## 2021-12-02 DIAGNOSIS — I83.891 SYMPTOMATIC VARICOSE VEINS OF RIGHT LOWER EXTREMITY: Primary | ICD-10-CM

## 2021-12-06 ENCOUNTER — PREP FOR PROCEDURE (OUTPATIENT)
Dept: VASCULAR SURGERY | Facility: CLINIC | Age: 36
End: 2021-12-06

## 2021-12-06 DIAGNOSIS — I83.891 VARICOSE VEINS OF LEG WITH EDEMA, RIGHT: Primary | ICD-10-CM
